# Patient Record
Sex: MALE | Race: WHITE | Employment: FULL TIME | URBAN - METROPOLITAN AREA
[De-identification: names, ages, dates, MRNs, and addresses within clinical notes are randomized per-mention and may not be internally consistent; named-entity substitution may affect disease eponyms.]

---

## 2019-02-01 ENCOUNTER — HOSPITAL ENCOUNTER (OUTPATIENT)
Facility: CLINIC | Age: 29
Setting detail: OBSERVATION
Discharge: HOME | End: 2019-02-02
Attending: EMERGENCY MEDICINE | Admitting: INTERNAL MEDICINE
Payer: COMMERCIAL

## 2019-02-01 ENCOUNTER — APPOINTMENT (OUTPATIENT)
Dept: CT IMAGING | Facility: CLINIC | Age: 29
End: 2019-02-01
Payer: COMMERCIAL

## 2019-02-01 DIAGNOSIS — R41.0 DELIRIUM: ICD-10-CM

## 2019-02-01 DIAGNOSIS — R55 RECURRENT SYNCOPE: ICD-10-CM

## 2019-02-01 DIAGNOSIS — G47.00 INSOMNIA, UNSPECIFIED TYPE: Primary | ICD-10-CM

## 2019-02-01 LAB
ALBUMIN SERPL-MCNC: 4.5 G/DL (ref 3.4–5)
ALBUMIN UR-MCNC: 30 MG/DL
ALP SERPL-CCNC: 134 U/L (ref 40–150)
ALT SERPL W P-5'-P-CCNC: 33 U/L (ref 0–70)
AMPHETAMINES UR QL SCN: NEGATIVE
ANION GAP SERPL CALCULATED.3IONS-SCNC: 16 MMOL/L (ref 6–17)
ANION GAP SERPL CALCULATED.3IONS-SCNC: 8 MMOL/L (ref 3–14)
APPEARANCE UR: CLEAR
AST SERPL W P-5'-P-CCNC: 34 U/L (ref 0–45)
BARBITURATES UR QL: NEGATIVE
BASOPHILS # BLD AUTO: 0.1 10E9/L (ref 0–0.2)
BASOPHILS NFR BLD AUTO: 0.8 %
BENZODIAZ UR QL: NEGATIVE
BILIRUB SERPL-MCNC: 0.3 MG/DL (ref 0.2–1.3)
BILIRUB UR QL STRIP: NEGATIVE
BUN SERPL-MCNC: 8 MG/DL (ref 5–24)
BUN SERPL-MCNC: 9 MG/DL (ref 7–30)
CA-I BLD-SCNC: 4.4 MG/DL (ref 4.4–5.2)
CALCIUM SERPL-MCNC: 8.6 MG/DL (ref 8.5–10.1)
CANNABINOIDS UR QL SCN: POSITIVE
CHLORIDE BLD-SCNC: 104 MMOL/L (ref 94–109)
CHLORIDE SERPL-SCNC: 108 MMOL/L (ref 94–109)
CO2 BLD-SCNC: 25 MMOL/L (ref 20–32)
CO2 BLDCOV-SCNC: 25 MMOL/L (ref 21–28)
CO2 SERPL-SCNC: 26 MMOL/L (ref 20–32)
COCAINE UR QL: NEGATIVE
COLOR UR AUTO: YELLOW
CREAT BLD-MCNC: 1.2 MG/DL (ref 0.66–1.25)
CREAT SERPL-MCNC: 0.89 MG/DL (ref 0.66–1.25)
D DIMER PPP FEU-MCNC: 0.3 UG/ML FEU (ref 0–0.5)
D DIMER PPP FEU-MCNC: NORMAL UG/ML FEU (ref 0–0.5)
DIFFERENTIAL METHOD BLD: ABNORMAL
EOSINOPHIL # BLD AUTO: 0.1 10E9/L (ref 0–0.7)
EOSINOPHIL NFR BLD AUTO: 1.7 %
ERYTHROCYTE [DISTWIDTH] IN BLOOD BY AUTOMATED COUNT: 13.7 % (ref 10–15)
ETHANOL SERPL-MCNC: 0.29 G/DL
GFR SERPL CREATININE-BSD FRML MDRD: 73 ML/MIN/{1.73_M2}
GFR SERPL CREATININE-BSD FRML MDRD: >90 ML/MIN/{1.73_M2}
GLUCOSE BLD-MCNC: 110 MG/DL (ref 70–99)
GLUCOSE BLDC GLUCOMTR-MCNC: 104 MG/DL (ref 70–99)
GLUCOSE SERPL-MCNC: 111 MG/DL (ref 70–99)
GLUCOSE UR STRIP-MCNC: NEGATIVE MG/DL
HCT VFR BLD AUTO: 56 % (ref 40–53)
HCT VFR BLD CALC: 56 %PCV (ref 40–53)
HGB BLD CALC-MCNC: 19 G/DL (ref 13.3–17.7)
HGB BLD-MCNC: 17.9 G/DL (ref 13.3–17.7)
HGB UR QL STRIP: NEGATIVE
IMM GRANULOCYTES # BLD: 0 10E9/L (ref 0–0.4)
IMM GRANULOCYTES NFR BLD: 0.1 %
KETONES UR STRIP-MCNC: NEGATIVE MG/DL
LACTATE BLD-SCNC: 1.8 MMOL/L (ref 0.7–2.1)
LEUKOCYTE ESTERASE UR QL STRIP: NEGATIVE
LYMPHOCYTES # BLD AUTO: 3.1 10E9/L (ref 0.8–5.3)
LYMPHOCYTES NFR BLD AUTO: 42 %
MCH RBC QN AUTO: 28.6 PG (ref 26.5–33)
MCHC RBC AUTO-ENTMCNC: 32 G/DL (ref 31.5–36.5)
MCV RBC AUTO: 90 FL (ref 78–100)
MONOCYTES # BLD AUTO: 0.6 10E9/L (ref 0–1.3)
MONOCYTES NFR BLD AUTO: 7.7 %
MUCOUS THREADS #/AREA URNS LPF: PRESENT /LPF
NEUTROPHILS # BLD AUTO: 3.6 10E9/L (ref 1.6–8.3)
NEUTROPHILS NFR BLD AUTO: 47.7 %
NITRATE UR QL: NEGATIVE
NRBC # BLD AUTO: 0 10*3/UL
NRBC BLD AUTO-RTO: 0 /100
OPIATES UR QL SCN: NEGATIVE
PCO2 BLDV: 46 MM HG (ref 40–50)
PCP UR QL SCN: NEGATIVE
PH BLDV: 7.34 PH (ref 7.32–7.43)
PH UR STRIP: 6 PH (ref 5–7)
PLATELET # BLD AUTO: 243 10E9/L (ref 150–450)
PO2 BLDV: 62 MM HG (ref 25–47)
POTASSIUM BLD-SCNC: 4 MMOL/L (ref 3.4–5.3)
POTASSIUM SERPL-SCNC: 4 MMOL/L (ref 3.4–5.3)
PROT SERPL-MCNC: 8.4 G/DL (ref 6.8–8.8)
RBC # BLD AUTO: 6.26 10E12/L (ref 4.4–5.9)
RBC #/AREA URNS AUTO: 1 /HPF (ref 0–2)
SAO2 % BLDV FROM PO2: 90 %
SODIUM BLD-SCNC: 145 MMOL/L (ref 133–144)
SODIUM SERPL-SCNC: 142 MMOL/L (ref 133–144)
SOURCE: ABNORMAL
SP GR UR STRIP: 1.02 (ref 1–1.03)
UROBILINOGEN UR STRIP-MCNC: 0 MG/DL (ref 0–2)
WBC # BLD AUTO: 7.5 10E9/L (ref 4–11)
WBC #/AREA URNS AUTO: 1 /HPF (ref 0–5)

## 2019-02-01 PROCEDURE — 80320 DRUG SCREEN QUANTALCOHOLS: CPT | Performed by: EMERGENCY MEDICINE

## 2019-02-01 PROCEDURE — 74177 CT ABD & PELVIS W/CONTRAST: CPT

## 2019-02-01 PROCEDURE — 82803 BLOOD GASES ANY COMBINATION: CPT

## 2019-02-01 PROCEDURE — 25000128 H RX IP 250 OP 636: Performed by: EMERGENCY MEDICINE

## 2019-02-01 PROCEDURE — 96374 THER/PROPH/DIAG INJ IV PUSH: CPT | Mod: 59

## 2019-02-01 PROCEDURE — 81001 URINALYSIS AUTO W/SCOPE: CPT | Performed by: EMERGENCY MEDICINE

## 2019-02-01 PROCEDURE — 83605 ASSAY OF LACTIC ACID: CPT

## 2019-02-01 PROCEDURE — 85014 HEMATOCRIT: CPT

## 2019-02-01 PROCEDURE — 80047 BASIC METABLC PNL IONIZED CA: CPT

## 2019-02-01 PROCEDURE — 99220 ZZC INITIAL OBSERVATION CARE,LEVL III: CPT | Performed by: INTERNAL MEDICINE

## 2019-02-01 PROCEDURE — 36415 COLL VENOUS BLD VENIPUNCTURE: CPT | Performed by: EMERGENCY MEDICINE

## 2019-02-01 PROCEDURE — 70498 CT ANGIOGRAPHY NECK: CPT

## 2019-02-01 PROCEDURE — 99291 CRITICAL CARE FIRST HOUR: CPT | Mod: 25

## 2019-02-01 PROCEDURE — 85025 COMPLETE CBC W/AUTO DIFF WBC: CPT | Performed by: EMERGENCY MEDICINE

## 2019-02-01 PROCEDURE — 96375 TX/PRO/DX INJ NEW DRUG ADDON: CPT

## 2019-02-01 PROCEDURE — 80053 COMPREHEN METABOLIC PANEL: CPT | Performed by: EMERGENCY MEDICINE

## 2019-02-01 PROCEDURE — 93005 ELECTROCARDIOGRAM TRACING: CPT

## 2019-02-01 PROCEDURE — 00000146 ZZHCL STATISTIC GLUCOSE BY METER IP

## 2019-02-01 PROCEDURE — 40000275 ZZH STATISTIC RCP TIME EA 10 MIN

## 2019-02-01 PROCEDURE — 96361 HYDRATE IV INFUSION ADD-ON: CPT

## 2019-02-01 PROCEDURE — 71260 CT THORAX DX C+: CPT

## 2019-02-01 PROCEDURE — 70450 CT HEAD/BRAIN W/O DYE: CPT

## 2019-02-01 PROCEDURE — 80307 DRUG TEST PRSMV CHEM ANLYZR: CPT | Performed by: EMERGENCY MEDICINE

## 2019-02-01 PROCEDURE — 85379 FIBRIN DEGRADATION QUANT: CPT | Performed by: EMERGENCY MEDICINE

## 2019-02-01 RX ORDER — IOPAMIDOL 755 MG/ML
500 INJECTION, SOLUTION INTRAVASCULAR ONCE
Status: COMPLETED | OUTPATIENT
Start: 2019-02-01 | End: 2019-02-01

## 2019-02-01 RX ORDER — LIDOCAINE 40 MG/G
CREAM TOPICAL
Status: DISCONTINUED | OUTPATIENT
Start: 2019-02-01 | End: 2019-02-02

## 2019-02-01 RX ORDER — KETOROLAC TROMETHAMINE 15 MG/ML
15 INJECTION, SOLUTION INTRAMUSCULAR; INTRAVENOUS ONCE
Status: COMPLETED | OUTPATIENT
Start: 2019-02-01 | End: 2019-02-01

## 2019-02-01 RX ORDER — SODIUM CHLORIDE 9 MG/ML
1000 INJECTION, SOLUTION INTRAVENOUS CONTINUOUS
Status: DISCONTINUED | OUTPATIENT
Start: 2019-02-01 | End: 2019-02-02

## 2019-02-01 RX ORDER — ACETAMINOPHEN 500 MG
1000 TABLET ORAL ONCE
Status: DISCONTINUED | OUTPATIENT
Start: 2019-02-01 | End: 2019-02-02

## 2019-02-01 RX ORDER — LORAZEPAM 2 MG/ML
1 INJECTION INTRAMUSCULAR ONCE
Status: COMPLETED | OUTPATIENT
Start: 2019-02-01 | End: 2019-02-01

## 2019-02-01 RX ADMIN — LORAZEPAM 1 MG: 2 INJECTION INTRAMUSCULAR; INTRAVENOUS at 21:00

## 2019-02-01 RX ADMIN — IOPAMIDOL 100 ML: 755 INJECTION, SOLUTION INTRAVENOUS at 21:38

## 2019-02-01 RX ADMIN — KETOROLAC TROMETHAMINE 15 MG: 15 INJECTION, SOLUTION INTRAMUSCULAR; INTRAVENOUS at 23:39

## 2019-02-01 RX ADMIN — SODIUM CHLORIDE 1000 ML: 9 INJECTION, SOLUTION INTRAVENOUS at 21:27

## 2019-02-01 RX ADMIN — SODIUM CHLORIDE 80 ML: 9 INJECTION, SOLUTION INTRAVENOUS at 21:39

## 2019-02-01 ASSESSMENT — ENCOUNTER SYMPTOMS
HEADACHES: 1
ABDOMINAL PAIN: 1
BACK PAIN: 1
COUGH: 1
SEIZURES: 1
FLANK PAIN: 1

## 2019-02-02 VITALS
BODY MASS INDEX: 26.51 KG/M2 | RESPIRATION RATE: 16 BRPM | TEMPERATURE: 98.4 F | HEART RATE: 94 BPM | SYSTOLIC BLOOD PRESSURE: 128 MMHG | DIASTOLIC BLOOD PRESSURE: 62 MMHG | OXYGEN SATURATION: 99 % | WEIGHT: 206.6 LBS

## 2019-02-02 PROBLEM — R55 SYNCOPE: Status: ACTIVE | Noted: 2019-02-02

## 2019-02-02 LAB
ALBUMIN SERPL-MCNC: 3.6 G/DL (ref 3.4–5)
ALP SERPL-CCNC: 106 U/L (ref 40–150)
ALT SERPL W P-5'-P-CCNC: 28 U/L (ref 0–70)
ANION GAP SERPL CALCULATED.3IONS-SCNC: 8 MMOL/L (ref 3–14)
AST SERPL W P-5'-P-CCNC: 27 U/L (ref 0–45)
BILIRUB SERPL-MCNC: 0.4 MG/DL (ref 0.2–1.3)
BUN SERPL-MCNC: 9 MG/DL (ref 7–30)
CALCIUM SERPL-MCNC: 8 MG/DL (ref 8.5–10.1)
CHLORIDE SERPL-SCNC: 110 MMOL/L (ref 94–109)
CO2 SERPL-SCNC: 24 MMOL/L (ref 20–32)
CREAT SERPL-MCNC: 0.81 MG/DL (ref 0.66–1.25)
ERYTHROCYTE [DISTWIDTH] IN BLOOD BY AUTOMATED COUNT: 13.8 % (ref 10–15)
GFR SERPL CREATININE-BSD FRML MDRD: >90 ML/MIN/{1.73_M2}
GLUCOSE SERPL-MCNC: 71 MG/DL (ref 70–99)
HCT VFR BLD AUTO: 50.9 % (ref 40–53)
HGB BLD-MCNC: 16.3 G/DL (ref 13.3–17.7)
MCH RBC QN AUTO: 28.6 PG (ref 26.5–33)
MCHC RBC AUTO-ENTMCNC: 32 G/DL (ref 31.5–36.5)
MCV RBC AUTO: 90 FL (ref 78–100)
PLATELET # BLD AUTO: 199 10E9/L (ref 150–450)
POTASSIUM SERPL-SCNC: 3.7 MMOL/L (ref 3.4–5.3)
PROT SERPL-MCNC: 6.7 G/DL (ref 6.8–8.8)
RBC # BLD AUTO: 5.69 10E12/L (ref 4.4–5.9)
SODIUM SERPL-SCNC: 142 MMOL/L (ref 133–144)
WBC # BLD AUTO: 5.8 10E9/L (ref 4–11)

## 2019-02-02 PROCEDURE — 80053 COMPREHEN METABOLIC PANEL: CPT | Performed by: INTERNAL MEDICINE

## 2019-02-02 PROCEDURE — 36415 COLL VENOUS BLD VENIPUNCTURE: CPT | Performed by: INTERNAL MEDICINE

## 2019-02-02 PROCEDURE — 96376 TX/PRO/DX INJ SAME DRUG ADON: CPT

## 2019-02-02 PROCEDURE — 25000132 ZZH RX MED GY IP 250 OP 250 PS 637: Performed by: INTERNAL MEDICINE

## 2019-02-02 PROCEDURE — G0378 HOSPITAL OBSERVATION PER HR: HCPCS

## 2019-02-02 PROCEDURE — 96375 TX/PRO/DX INJ NEW DRUG ADDON: CPT

## 2019-02-02 PROCEDURE — 85027 COMPLETE CBC AUTOMATED: CPT | Performed by: INTERNAL MEDICINE

## 2019-02-02 PROCEDURE — 25000125 ZZHC RX 250: Performed by: INTERNAL MEDICINE

## 2019-02-02 PROCEDURE — 25000128 H RX IP 250 OP 636: Performed by: INTERNAL MEDICINE

## 2019-02-02 PROCEDURE — 99217 ZZC OBSERVATION CARE DISCHARGE: CPT | Performed by: INTERNAL MEDICINE

## 2019-02-02 RX ORDER — METOCLOPRAMIDE HYDROCHLORIDE 5 MG/ML
10 INJECTION INTRAMUSCULAR; INTRAVENOUS EVERY 6 HOURS PRN
Status: DISCONTINUED | OUTPATIENT
Start: 2019-02-02 | End: 2019-02-02 | Stop reason: HOSPADM

## 2019-02-02 RX ORDER — ONDANSETRON 4 MG/1
4 TABLET, ORALLY DISINTEGRATING ORAL EVERY 6 HOURS PRN
Status: DISCONTINUED | OUTPATIENT
Start: 2019-02-02 | End: 2019-02-02 | Stop reason: HOSPADM

## 2019-02-02 RX ORDER — CYCLOBENZAPRINE HCL 5 MG
5 TABLET ORAL 2 TIMES DAILY PRN
Qty: 20 TABLET | Refills: 0 | Status: SHIPPED | OUTPATIENT
Start: 2019-02-02 | End: 2019-02-12

## 2019-02-02 RX ORDER — ONDANSETRON 2 MG/ML
4 INJECTION INTRAMUSCULAR; INTRAVENOUS EVERY 6 HOURS PRN
Status: DISCONTINUED | OUTPATIENT
Start: 2019-02-02 | End: 2019-02-02 | Stop reason: HOSPADM

## 2019-02-02 RX ORDER — LORAZEPAM 1 MG/1
1-2 TABLET ORAL EVERY 30 MIN PRN
Status: DISCONTINUED | OUTPATIENT
Start: 2019-02-02 | End: 2019-02-02 | Stop reason: HOSPADM

## 2019-02-02 RX ORDER — ACETAMINOPHEN 325 MG/1
650 TABLET ORAL EVERY 4 HOURS PRN
Status: DISCONTINUED | OUTPATIENT
Start: 2019-02-02 | End: 2019-02-02 | Stop reason: HOSPADM

## 2019-02-02 RX ORDER — MAGNESIUM SULFATE HEPTAHYDRATE 40 MG/ML
4 INJECTION, SOLUTION INTRAVENOUS EVERY 4 HOURS PRN
Status: DISCONTINUED | OUTPATIENT
Start: 2019-02-02 | End: 2019-02-02 | Stop reason: HOSPADM

## 2019-02-02 RX ORDER — ZOLPIDEM TARTRATE 5 MG/1
5 TABLET ORAL
Qty: 10 TABLET | Refills: 0 | Status: SHIPPED | OUTPATIENT
Start: 2019-02-02 | End: 2019-02-12

## 2019-02-02 RX ORDER — POTASSIUM CHLORIDE 29.8 MG/ML
20 INJECTION INTRAVENOUS
Status: DISCONTINUED | OUTPATIENT
Start: 2019-02-02 | End: 2019-02-02 | Stop reason: HOSPADM

## 2019-02-02 RX ORDER — MULTIPLE VITAMINS W/ MINERALS TAB 9MG-400MCG
1 TAB ORAL DAILY
Status: DISCONTINUED | OUTPATIENT
Start: 2019-02-03 | End: 2019-02-02 | Stop reason: HOSPADM

## 2019-02-02 RX ORDER — SODIUM CHLORIDE, SODIUM LACTATE, POTASSIUM CHLORIDE, CALCIUM CHLORIDE 600; 310; 30; 20 MG/100ML; MG/100ML; MG/100ML; MG/100ML
INJECTION, SOLUTION INTRAVENOUS CONTINUOUS
Status: DISCONTINUED | OUTPATIENT
Start: 2019-02-02 | End: 2019-02-02 | Stop reason: HOSPADM

## 2019-02-02 RX ORDER — PROCHLORPERAZINE 25 MG
25 SUPPOSITORY, RECTAL RECTAL EVERY 12 HOURS PRN
Status: DISCONTINUED | OUTPATIENT
Start: 2019-02-02 | End: 2019-02-02 | Stop reason: HOSPADM

## 2019-02-02 RX ORDER — ONDANSETRON 4 MG/1
4 TABLET, ORALLY DISINTEGRATING ORAL EVERY 6 HOURS PRN
Status: DISCONTINUED | OUTPATIENT
Start: 2019-02-02 | End: 2019-02-02

## 2019-02-02 RX ORDER — KETOROLAC TROMETHAMINE 30 MG/ML
30 INJECTION, SOLUTION INTRAMUSCULAR; INTRAVENOUS EVERY 6 HOURS PRN
Status: DISCONTINUED | OUTPATIENT
Start: 2019-02-02 | End: 2019-02-02 | Stop reason: HOSPADM

## 2019-02-02 RX ORDER — FOLIC ACID 1 MG/1
1 TABLET ORAL DAILY
Status: DISCONTINUED | OUTPATIENT
Start: 2019-02-03 | End: 2019-02-02 | Stop reason: HOSPADM

## 2019-02-02 RX ORDER — ONDANSETRON 2 MG/ML
4 INJECTION INTRAMUSCULAR; INTRAVENOUS EVERY 6 HOURS PRN
Status: DISCONTINUED | OUTPATIENT
Start: 2019-02-02 | End: 2019-02-02

## 2019-02-02 RX ORDER — LANOLIN ALCOHOL/MO/W.PET/CERES
100 CREAM (GRAM) TOPICAL DAILY
Status: DISCONTINUED | OUTPATIENT
Start: 2019-02-03 | End: 2019-02-02 | Stop reason: HOSPADM

## 2019-02-02 RX ORDER — POTASSIUM CL/LIDO/0.9 % NACL 10MEQ/0.1L
10 INTRAVENOUS SOLUTION, PIGGYBACK (ML) INTRAVENOUS
Status: DISCONTINUED | OUTPATIENT
Start: 2019-02-02 | End: 2019-02-02 | Stop reason: HOSPADM

## 2019-02-02 RX ORDER — CLONIDINE HYDROCHLORIDE 0.1 MG/1
0.1 TABLET ORAL 2 TIMES DAILY
Status: DISCONTINUED | OUTPATIENT
Start: 2019-02-02 | End: 2019-02-02 | Stop reason: HOSPADM

## 2019-02-02 RX ORDER — POTASSIUM CHLORIDE 1500 MG/1
20-40 TABLET, EXTENDED RELEASE ORAL
Status: DISCONTINUED | OUTPATIENT
Start: 2019-02-02 | End: 2019-02-02 | Stop reason: HOSPADM

## 2019-02-02 RX ORDER — POTASSIUM CHLORIDE 1.5 G/1.58G
20-40 POWDER, FOR SOLUTION ORAL
Status: DISCONTINUED | OUTPATIENT
Start: 2019-02-02 | End: 2019-02-02 | Stop reason: HOSPADM

## 2019-02-02 RX ORDER — LIDOCAINE 40 MG/G
CREAM TOPICAL
Status: DISCONTINUED | OUTPATIENT
Start: 2019-02-02 | End: 2019-02-02 | Stop reason: HOSPADM

## 2019-02-02 RX ORDER — PROCHLORPERAZINE MALEATE 5 MG
10 TABLET ORAL EVERY 6 HOURS PRN
Status: DISCONTINUED | OUTPATIENT
Start: 2019-02-02 | End: 2019-02-02 | Stop reason: HOSPADM

## 2019-02-02 RX ORDER — POTASSIUM CHLORIDE 7.45 MG/ML
10 INJECTION INTRAVENOUS
Status: DISCONTINUED | OUTPATIENT
Start: 2019-02-02 | End: 2019-02-02 | Stop reason: HOSPADM

## 2019-02-02 RX ORDER — NITROGLYCERIN 0.4 MG/1
0.4 TABLET SUBLINGUAL EVERY 5 MIN PRN
Status: DISCONTINUED | OUTPATIENT
Start: 2019-02-02 | End: 2019-02-02 | Stop reason: HOSPADM

## 2019-02-02 RX ORDER — LORAZEPAM 2 MG/ML
1-2 INJECTION INTRAMUSCULAR EVERY 30 MIN PRN
Status: DISCONTINUED | OUTPATIENT
Start: 2019-02-02 | End: 2019-02-02 | Stop reason: HOSPADM

## 2019-02-02 RX ORDER — ACETAMINOPHEN 650 MG/1
650 SUPPOSITORY RECTAL EVERY 4 HOURS PRN
Status: DISCONTINUED | OUTPATIENT
Start: 2019-02-02 | End: 2019-02-02 | Stop reason: HOSPADM

## 2019-02-02 RX ORDER — METOCLOPRAMIDE 5 MG/1
10 TABLET ORAL EVERY 6 HOURS PRN
Status: DISCONTINUED | OUTPATIENT
Start: 2019-02-02 | End: 2019-02-02 | Stop reason: HOSPADM

## 2019-02-02 RX ADMIN — KETOROLAC TROMETHAMINE 30 MG: 30 INJECTION, SOLUTION INTRAMUSCULAR at 05:58

## 2019-02-02 RX ADMIN — FOLIC ACID: 5 INJECTION, SOLUTION INTRAMUSCULAR; INTRAVENOUS; SUBCUTANEOUS at 01:28

## 2019-02-02 RX ADMIN — ACETAMINOPHEN 650 MG: 325 TABLET, FILM COATED ORAL at 08:09

## 2019-02-02 RX ADMIN — CLONIDINE HYDROCHLORIDE 0.1 MG: 0.1 TABLET ORAL at 08:09

## 2019-02-02 RX ADMIN — SODIUM CHLORIDE, POTASSIUM CHLORIDE, SODIUM LACTATE AND CALCIUM CHLORIDE: 600; 310; 30; 20 INJECTION, SOLUTION INTRAVENOUS at 12:42

## 2019-02-02 RX ADMIN — ONDANSETRON 4 MG: 2 INJECTION INTRAMUSCULAR; INTRAVENOUS at 05:59

## 2019-02-02 RX ADMIN — CLONIDINE HYDROCHLORIDE 0.1 MG: 0.1 TABLET ORAL at 01:51

## 2019-02-02 RX ADMIN — KETOROLAC TROMETHAMINE 30 MG: 30 INJECTION, SOLUTION INTRAMUSCULAR at 10:58

## 2019-02-02 NOTE — PLAN OF CARE
"PRIMARY DIAGNOSIS: \"GENERIC\" NURSING  OUTPATIENT/OBSERVATION GOALS TO BE MET BEFORE DISCHARGE:  ADLs back to baseline: No    Activity and level of assistance: Up with standby assistance.    Pain status: IV toradol     Return to near baseline physical activity: No     Discharge Planner Nurse   Safe discharge environment identified: Yes  Barriers to discharge: Yes       Entered by: ELIZABETH GRANADOS 02/02/2019 4:57 AM     Please review provider order for any additional goals.   Nurse to notify provider when observation goals have been met and patient is ready for discharge.  "

## 2019-02-02 NOTE — ED TRIAGE NOTES
A&O x4, ABCDs intact. Pt arrives with red team activation for syncopal events. Pt states his neck, back and pain hurt. Pt states he has been coughing up blood. Pt in room and appears to have a seizure that lasted for approx. 30 seconds.

## 2019-02-02 NOTE — PROVIDER NOTIFICATION
pt has no diet order and is complaining of pain at a 9 that is located in the neck and radiates to right arm as well as right sided abdominal pain. can pt have something for pain?

## 2019-02-02 NOTE — CONSULTS
"[facility]  Neurology Consultation         Charu Grace MD    Shaw Valencia MRN# [id number]   YOB: 1990 Age: 28 year old      Date of Admission:  2/1/2019  Date of Consult: 2/2/2019                    Primary Care Physician:   Wendy Fields [phone]         Requesting Physician:      Dr. Haile         Chief Complaint:   Possible seizure         History of Present Illness:   Shaw Valencia is a 28 year old male with past medical history significant for hx of depression with prior SI, polysubstance abuse who presented to ER on 2/1/2019 for events concerning for LOC.   He describes for the past 3 months pain in neck into right shoulder, arm, back, abdominal and chest pain.  He was seen in [facility] ER on 1/13/19 with chest pain and right arm numbness work up with MRI Brain and MRI C spine with no significant findings other than on area of T2 signal change in the brain.  Also had neg PE CT chest/CT A/P and discharged with plans to follow up with neurology.  He reports then yesterday had up to 6 events of \"passing out\" due to pain.  He reports over the last 3 months on day prior with 3 events of \"passing out\" due to pain but poor historian.  He reports yesterday that the pain would become intense and the he would just \"pass out and seize\".  He is not able to give a description but states he would be confused and then be told what was happening. Told his eyes would roll back and he would jerk.  He denies tongue biting, loss of bladder or confusion lasting more than seconds after events.  I was not able to speak with a witness. He describes feeling very anxious/panic like with events.   In ER triage reportedly had event with code red called. Given 1mg ativan. In ER, underwent extensive eval with CT head, CTA neck, CT aortic all wnl.  Labs reassuring.  ETOH level 0.29, Utox positive for cannabis.     Today, he reports no change in thinking, rates pain in head, " neck, shoulder, abdomen as 9/10 with Toradol.  He reports no prior seizure hx but with brother with seizures.  He reports one event of possible concussion but no specific prior diagnosis or other head trauma.  No prior LOC events than described above.  He does not drive.  He reports drinks several times a week, uses marijuana several times a day.  Some reports from other he may drink more.  Denies prior hx of withdraw and currently denies withdraw symptoms.  He reports one event of right arm numbness lasting seconds, no numbness in legs or left arm and no changes in bowel or bladder.  Does report coughing blood and urinating blood several days ago.  Denies other acute symptoms.           Past Medical History:     Patient Active Problem List   Diagnosis     Suicide ideation     Unresponsiveness     Alcohol intoxication (H)     Syncope      Past Medical History:   Diagnosis Date     EtOH dependence (H)                Past Surgical History:   No past surgical history on file.           Home Medications:     Prior to Admission medications    Not on File            Current Medications:           cloNIDine  0.1 mg Oral BID     [START ON 2/3/2019] folic acid  1 mg Oral Daily     menthol   Transdermal Q8H     [START ON 2/3/2019] multivitamin w/minerals  1 tablet Oral Daily     sodium chloride (PF)  3 mL Intracatheter Q8H     [START ON 2/3/2019] vitamin B1  100 mg Oral Daily     acetaminophen, acetaminophen, ketorolac, lidocaine 4%, lidocaine (buffered or not buffered), LORazepam **OR** LORazepam, magnesium sulfate, menthol **AND** menthol **AND** menthol, metoclopramide **OR** metoclopramide, nitroGLYcerin, ondansetron **OR** ondansetron, potassium chloride, potassium chloride with lidocaine, potassium chloride, potassium chloride, potassium chloride, prochlorperazine **OR** prochlorperazine **OR** prochlorperazine, sodium chloride (PF)         Allergies:     Allergies   Allergen Reactions     Ibuprofen             Social  History:     ETOH use  Marijuana use  Smoker  Lives with mother   Does not drive  Works for landscape [employer]            Family History:   No family history on file.            Review of Systems:   The 10 point Review of Systems is negative other than noted in the HPI.             Physical Exam:   Heart Rate: 85, Blood pressure 128/62, pulse 94, temperature 98.4  F (36.9  C), temperature source Oral, resp. rate 16, weight 93.7 kg (206 lb 9.6 oz), SpO2 99 %.  206 lbs 9.6 oz       Cardio - Heart Rate Reg, Distal pulses palpable  Resp - Breathing non labored    Neurological Exam:  General: Mental status -Alert and orientated, speech without dysarthria, language fluent with intact naming and repetition  Cranial Nerves- Fundoscopic exam with no disc pallor. Pupils equal round and reactive to light. Extraocular movements intact. No nystagmus.  Face symmetric and intact to light touch, tongue midline, palate activates symmetrically. Shoulder shrug 5/5  Motor: Normal muscle bulk and tone.  Has 5/5 strength in bilateral upper and lower extremities both proximally and distally.   Sensation: intact to light touch and pinprick throughout upper and lower extremities.   Reflexes:  Biceps 2/2, brachioradialis 2/2, patella 2/2, ankles 2/2, toes downgoing  Cerebellar: intact FNF  Gait: patient laying in bed.          Data:   All new lab and imaging data was reviewed.  Recent Labs   Lab 02/02/19  0607 02/01/19  2109 02/01/19  2104   WBC 5.8  --  7.5   HGB 16.3 19.0* 17.9*   MCV 90  --  90     --  243    145* 142   POTASSIUM 3.7 4.0 4.0   CHLORIDE 110* 104 108   CO2 24  --  26   BUN 9 8 9   CR 0.81  --  0.89   ANIONGAP 8 16 8   OPAL 8.0*  --  8.6   GLC 71 110* 111*   ALBUMIN 3.6  --  4.5   PROTTOTAL 6.7*  --  8.4   BILITOTAL 0.4  --  0.3   ALKPHOS 106  --  134   ALT 28  --  33   AST 27  --  34       ..  Recent Results (from the past 24 hour(s))   Head CT w/o contrast    Narrative    CT SCAN OF THE HEAD WITHOUT CONTRAST    2/1/2019 10:02 PM     HISTORY: Altered level of consciousness (LOC), unexplained.    TECHNIQUE: Axial images of the head and coronal reformations without  IV contrast material. Radiation dose for this scan was reduced using  automated exposure control, adjustment of the mA and/or kV according  to patient size, or iterative reconstruction technique.    COMPARISON: Head CT 10/21/2014    FINDINGS: The cerebral hemispheres, brainstem, and cerebellum  demonstrate normal morphology and attenuation. No evidence of acute  ischemia, hemorrhage, mass, mass effect, or hydrocephalus. The  visualized calvarium, skull base, and extracranial soft tissues are  unremarkable. Mild paranasal sinus mucosal thickening is present.      Impression    IMPRESSION: No acute intracranial abnormality.    MELISSA GAN MD   CT Head Neck Angio w/o & w Contrast    Narrative    CT ANGIOGRAM OF THE HEAD AND NECK WITH CONTRAST  2/1/2019 10:03 PM     HISTORY: Headache, sudden, carotid/vertebral dissection suspected.     TECHNIQUE:  CT angiography with an injection of 60mL Isovue-370 IV  with scans through the head and neck. Images were transferred to a  separate 3-D workstation where multiplanar reformations and 3-D images  were created. Estimates of carotid stenoses are made relative to the  distal internal carotid artery diameters except as noted. Radiation  dose for this scan was reduced using automated exposure control,  adjustment of the mA and/or kV according to patient size, or iterative  reconstruction technique.    COMPARISON: None.     CT ANGIOGRAM HEAD FINDINGS:    The vertebral arteries, basilar artery, and posterior cerebral  arteries are patent. The internal carotid arteries, anterior cerebral  arteries, and middle cerebral arteries are patent. Dural venous  sinuses are unremarkable.     CT ANGIOGRAM NECK FINDINGS:   The common carotid, internal carotid, external carotid, and vertebral  arteries are patent. No evidence of dissection  or flow-limiting  stenosis.       Impression    IMPRESSION:   Patent arteries in the head and neck. No evidence of dissection.     MELISSA GAN MD   CT Aortic Survey w Contrast    Narrative    CT AORTIC SURVEY WITH CONTRAST   2/1/2019 10:04 PM     HISTORY: Chest, back and abdominal pain.    TECHNIQUE: 40mL Isovue-370 IV were administered. After contrast  administration, volumetric helical sections were acquired from the  thoracic inlet to the ischial tuberosities. Coronal images were also  reconstructed. Radiation dose for this scan was reduced using  automated exposure control, adjustment of the mA and/or kV according  to patient size, or iterative reconstruction technique.    COMPARISON: None.    FINDINGS:    Chest: The thoracic aorta is of normal caliber, without evidence for  aneurysm or dissection. No pleural or pericardial effusions. No  enlarged lymph nodes are identified in the chest. The lungs are clear.      Abdomen and Pelvis: No evidence for abdominal aortic aneurysm or  dissection. The liver, gallbladder, spleen, adrenal glands, pancreas,  and kidneys are unremarkable. No hydronephrosis. No bowel obstruction.  No free fluid in the pelvis. No evidence for colitis or  diverticulitis. No intra-abdominal fluid collections. Small  fat-containing ventral hernia in the supraumbilical region.       Impression    IMPRESSION: No acute abnormality in the chest, abdomen, or pelvis. No  evidence for aortic aneurysm or dissection.      DIOGO BOYER MD     Other from [facility]     1/13/19 MRI Brain wo  INDICATION:  Right arm numbness.    TECHNIQUE:  Multiplanar multisequence noncontrast MR images were obtained through the brain.    COMPARISON:  CT brain 02/09/2018.    FINDINGS:  The ventricles and sulci are within normal limits for patient age. No mass effect or midline shift. Small 8 mm ovoid focus of T2 FLAIR hyperintensity within the periventricular white matter adjacent to the anterior margin of the left  temporal horn, nonspecific. No other parenchymal signal abnormalities are identified.  No intracranial hemorrhage or pathologic extra-axial fluid collection. No areas of diffusion restriction to suggest acute infarction.  The major arterial flow voids of the skullbase are preserved. The globes are symmetric in size. Mild-to-moderate opacification of the ethmoid air cells. Mild mucosal thickening in the remainder of the paranasal sinuses. Trace fluid in the left mastoid air cells.    IMPRESSION:  1. No acute infarction, mass effect, or intracranial hemorrhage.  2. Small ovoid focus of T2 prolongation within the periventricular white matter adjacent to the left temporal horn. Differential considerations include sequelae of prior inflammation or demyelination.    MRI brain with contrast 1/13/19  INDICATION:  Periventricular signal abnormality adjacent to the left temporal horn.    TECHNIQUE:  Multiplanar T1 postcontrast MR images were obtained through the brain.    COMPARISON:  MRI brain 01/13/2019 earlier the same day.    FINDINGS:  No pathologic intracranial enhancement is identified.     IMPRESSION:  1. No pathologic intracranial enhancement. Specifically, no abnormal enhancement corresponding to the focus of signal abnormality within the left temporal periventricular white matter.      1/13/19 MRI C spine wo  INDICATION:  Right arm numbness.    TECHNIQUE:  Multiplanar multisequence noncontrast MR images were obtained through the cervical spine.    COMPARISON:  None.    FINDINGS:  The patient was rotated during acquisition of the axial T2 sequence.  The cervical lordosis is maintained. No spondylolisthesis. Vertebral heights are preserved. No acute fracture. No concerning bone marrow signal abnormalities. The cervical cord is normal in signal intensity.  C2-3: No significant spinal canal or neural foraminal stenosis.  C3-4: Mild disc degeneration. Shallow annular bulging and marginal spurring. No significant spinal  canal or neural foraminal stenosis.  C4-5: No significant spinal canal or neural foraminal stenosis.  C5-6: Marginal spurring. No significant spinal canal or neural foraminal stenosis.  C6-7: No significant spinal canal or neural foraminal stenosis.  C7-T1: No significant spinal canal or neural foraminal stenosis.  No significant spinal canal or neural foraminal stenosis in the visualized upper thoracic spine.    IMPRESSION:  1. No acute fracture, spondylolisthesis, or cord signal abnormality.  2. Minimal cervical spondylosis without significant spinal canal or neural foraminal stenosis.       Assessment and Plan:     1. Spells of loss of consciousness  Possible seizure but given description/frequency/ and timing of events this sounds unlikely. Given association with polysubstance use could consider ETOH withdraw  Seizure or seizure related to marijuana use but no active signs of withdraw.    Strong clinical suspicion for non-epileptic events but was not able to interview 1st person witness.  Has had Head CT and MRI brain with only one nonspecific T2 change on 1/13/19.   Plan for neurology f/u outpt and outpatient EEG  (neurology was also recommended at discharge on 1/13/19 from [facility] and pt is unclear if f/u is currently pending)  Does not currently drive.   Would not start anti-epileptics currently.       2. Neck pain/headache/right shoulder pain -  Ongoing for 3 months - prior eval with MRI B/C on 1/13/19 with no acute findings  Per primary team requesting narcotics - defer discharge pain med to primary team  Recommend f/u with PCP given neg work up and consider pain clinic referral      3. Abdominal Pain  Reports hx of blood in urine and hemoptysis   Eval neg -  Defer to primary team       4. Polysubstance abuse  Social work eval for chemical dependency    5. Depression history  Concern depression/mood could be contributing to presentation  Recommend psych eval as outpt if discharges

## 2019-02-02 NOTE — ED NOTES
Pt provided urine sample via urinal at bedside. No complications when pt standing.   1 day old ex FT baby boy   doing well, with no major concerns.   Feeding breast milk with good urine output and stool    Physical Examination  Vital signs: T(C): 36.9 (19 @ 20:55), Max: 36.9 (19 @ 20:55)  HR: 123 (19 @ 20:55) (114 - 128)  BP: --  RR: 41 (19 @ 20:55) (39 - 41)  SpO2: --  Wt(kg): 3765g  Weight change =  -2.6 %  General Appearance: comfortable, no distress, no dysmorphic features   Head: normocephalic, anterior fontanelle open and flat  Eyes/ENT: red reflex present b/l, palate intact  Neck/clavicles: no masses, no crepitus  Chest: no grunting, flaring or retractions, clear and equal breath sounds b/l  CV: RRR, nl S1 S2, no murmurs, well perfused  Abdomen: soft, nontender, nondistended, no masses  :  normal male genitalia, testes descended b/l, anus appears to be patent  Back: no defects  Extremities: full range of motion, no hip clicks, normal digits. 2+ Femoral pulses.  Neuro: good tone, moves all extremities, symmetric Dudley, suck, grasp  Skin: no lesions, no jaundice

## 2019-02-02 NOTE — PLAN OF CARE
OBSERVATION patient END time: 1600  Reviewed AVS info with pt/SO.both state an understanding .  RX filled for flexaril & Ambien. Discussed need for ETOH cessation.  Also discussed need to make appts on Monday when offices open for   Neurology , pain clinician , mental health for anxiety & depression concerns &  primary to discuss his ongoing symptoms  Of HA,rt  neck & shoulder pain that have been ongoing for years.  Has had multiple NEG. Imaging scans over last 3-4 yrs . No seizure activity witnessed while on MS3 .

## 2019-02-02 NOTE — ED NOTES
"[facility]  ED Nurse Handoff Report    Shaw Valencia is a 28 year old male   ED Chief complaint: Loss of Consciousness; Headache; and Hematemesis  . ED Diagnosis:   Final diagnoses:   Delirium     Allergies:   Allergies   Allergen Reactions     Ibuprofen        Code Status: Full Code  Activity level - Baseline/Home:  Independent. Activity Level - Current:   Stand with Assist. Lift room needed: No. Bariatric: No   Needed: No   Isolation: No. Infection: Not Applicable.     Vital Signs:   Vitals:    02/01/19 2215 02/01/19 2230 02/01/19 2245 02/01/19 2250   BP: 155/72 (!) 122/98 128/84    Pulse:  94     Resp: 29      Temp:    97.8  F (36.6  C)   TempSrc:    Temporal   SpO2:   98%        Cardiac Rhythm:  ,      Pain level:    Patient confused: No. Patient Falls Risk: Yes.   Elimination Status: Has voided   Patient Report - Initial Complaint: Syncope. Focused Assessment: A&O x4, ABCDs intact. Pt arrives for what his mom states as \"blacking out throughout the day.\" Pt states he has pain in his head, neck, and bilateral flank. Pt is noted to have \"blackouts\" when his mom is in the room, but his behavior changes when RN and MD in room. Pt redirectable. Skin color natural, LS clear bilaterally, peripheral and central pulses strong bilaterally.   Tests Performed: lab, imaging. Abnormal Results:   Labs Ordered and Resulted from Time of ED Arrival Up to the Time of Departure from the ED   GLUCOSE BY METER - Abnormal; Notable for the following components:       Result Value    Glucose 104 (*)     All other components within normal limits   CBC WITH PLATELETS DIFFERENTIAL - Abnormal; Notable for the following components:    RBC Count 6.26 (*)     Hemoglobin 17.9 (*)     Hematocrit 56.0 (*)     All other components within normal limits   DRUG ABUSE SCREEN 77 URINE (FL, RH, SH) - Abnormal; Notable for the following components:    Cannabinoids Qual Urine Positive (*)     All other components within " normal limits   ROUTINE UA WITH MICROSCOPIC - Abnormal; Notable for the following components:    Protein Albumin Urine 30 (*)     Mucous Urine Present (*)     All other components within normal limits   COMPREHENSIVE METABOLIC PANEL - Abnormal; Notable for the following components:    Glucose 111 (*)     All other components within normal limits   ALCOHOL ETHYL - Abnormal; Notable for the following components:    Ethanol g/dL 0.29 (*)     All other components within normal limits   ISTAT BASIC MET ICA HCT POCT - Abnormal; Notable for the following components:    Sodium 145 (*)     Glucose 110 (*)     Hemoglobin 19.0 (*)     Hematocrit - POCT 56 (*)     All other components within normal limits   ISTAT  GASES LACTATE MAGGY POCT - Abnormal; Notable for the following components:    PO2 Venous 62 (*)     All other components within normal limits   D DIMER QUANTITATIVE   D DIMER QUANTITATIVE   PULSE OXIMETRY NURSING   IV ACCESS   PERIPHERAL IV CATHETER     CT Head Neck Angio w/o & w Contrast   Final Result   IMPRESSION:    Patent arteries in the head and neck. No evidence of dissection.       MELISSA GAN MD      CT Aortic Survey w Contrast   Final Result   IMPRESSION: No acute abnormality in the chest, abdomen, or pelvis. No   evidence for aortic aneurysm or dissection.         DIOGO BOYER MD      Head CT w/o contrast   Final Result   IMPRESSION: No acute intracranial abnormality.      MELISSA GAN MD        .   Treatments provided: See MAR  Family Comments: None  OBS brochure/video discussed/provided to patient:  Yes  ED Medications:   Medications   lidocaine 1 % 1 mL (not administered)   lidocaine (LMX4) cream (not administered)   sodium chloride (PF) 0.9% PF flush 3 mL (not administered)   sodium chloride (PF) 0.9% PF flush 3 mL (not administered)   0.9% sodium chloride BOLUS (1,000 mLs Intravenous New Bag 2/1/19 2129)     Followed by   sodium chloride 0.9% infusion (not administered)   LORazepam (ATIVAN)  injection 1 mg (1 mg Intravenous Given 2/1/19 2100)   0.9% sodium chloride BOLUS (0 mLs Intravenous Stopped 2/1/19 2142)   iopamidol (ISOVUE-370) solution 500 mL (100 mLs Intravenous Given 2/1/19 2138)     Drips infusing:  Yes  For the majority of the shift, the patient's behavior Green. Interventions performed were None.     Severe Sepsis OR Septic Shock Diagnosis Present: No      ED Nurse Name/Phone Number: Ko Grewal,   10:56 PM    RECEIVING UNIT ED HANDOFF REVIEW    Above ED Nurse Handoff Report was reviewed: Yes  Reviewed by: ELIZABETH GRANADOS on February 2, 2019 at 12:19 AM

## 2019-02-02 NOTE — PHARMACY-ADMISSION MEDICATION HISTORY
Admission medication history interview status for this patient is complete. See [facility] admission navigator for allergy information, prior to admission medications and immunization status.     Medication history interview source(s):Patient  Medication history resources (including written lists, pill bottles, clinic record):None    Changes made to PTA medication list:  Added: none  Deleted: oxycodone and burow's soln  Changed: none    Actions taken by pharmacist (provider contacted, etc):None     Additional medication history information:None    Medication reconciliation/reorder completed by provider prior to medication history? No    For patients on insulin therapy: no (Yes/No)   Lantus/levemir/NPH/Mix 70/30 dose: ___ in AM/PM or twice daily   Sliding scale Novolog Y/N   If Yes, do you have a baseline novolog pre-meal dose: ______units with meals   Patients eat three meals a day: Y/N ---  How many episodes of hypoglycemia (low blood glucose) do you have weekly: ---   How many missed doses do you have a week: ---  How many times do you check your blood glucose per day: ---  Any Barriers to therapy: cost of medications/comfortable with giving injections (if applicable)/ comfortable and confident with current diabetes regimen ---      Prior to Admission medications    Not on File

## 2019-02-02 NOTE — ED NOTES
Pt's mom in room heard telling pt to sit down and that he is in the hospital. This RN walked into room and firmly told pt that he was just standing for 10 minutes and had no problems and was acting appropriately, and was asked to explain what was happening. This behavior continues when pt's mom is in room with pt, but when mom steps out of room, pt acts appropriately.

## 2019-02-02 NOTE — H&P
Hospitalist Observation Admission Note(Critical access hospital/[facility])    Name: Shaw Valencia    MRN: [id number]          YOB: 1990    Age: 28 year old    Date of admission: 2/1/2019    Primary care provider: Wendy Fields  Admitting physician:Marcos Carballo                 Assessment      Brief summary of admission assessment:Shaw Valencia is a 28 year old  male with a significant past medical history of polysubstance abuse including marijuana and alcohol, depression, previous suicidal ideations, who presents with altered mental status and fall.  Patient is here with his mother with whom he lives.  She stated that he is not doing well the whole day today with occasional sleepiness and body shaking as well as falls.      ED evaluation revealed elevated blood pressure, hemoconcentration, blood alcohol level of 0.29 g/dL. Patient was somnolent but arousable.  He had extensive study of CT scan with aortic survey, CTA of the neck and head without any acute pathology.  Of note patient was seen recently in outside facility and evaluated as well.    He was given IV Lasix, IV fluids as well as Ativan and was admitted to our service.        #1.  Acute encephalopathy: Suspect toxic/metabolic encephalopathy secondary to alcohol intoxication as well as marijuana use  #2.  Alcohol intoxication  #3.  Hemoconcentration  #4.  Frequent falls and concern for seizure: I doubt he has seizure or significant cardiac problem          Reason for admission:    Observation admission for monitoring and management of alcohol intoxication    Observation Goals:  --Improved mental status         Comorbid conditions:      Major depression             Plan     > Admission Status:Admit to observation     >Care plan:  --Admit to observation bed, IV fluid hydration, electrolyte monitoring and replacement, watch for alcohol withdrawal symptoms and treat with Ativan if needed.  Patient is complaining of pain all over  requesting narcotic medications but he needs to be treated with nonnarcotic medications due to alcohol intoxication and substance abuse.  I will make Toradol as needed available for him.    >Supportive care:IV fluids continued  Pain management: acetominophen, anxiolytics and NSAIDs  Nausea and vomiting control measures    >Diet:Diet advanced    >Activity:Advance activity as tolerated    >Education/Counseling :Discussed treatment plan with the patient    >Consults:none     >VTE prophylactic measures:prophylaxis against venous thromboembolism    >Therapies:none       >Additional orders    --Care plan discussed with the patient/family and agreed to care plan   --Patient will be transferred to care of hospitalist attending for further evaluation and management as appropriate   --Old medical orders reviewed   --imaging result independently reviewed by me     (See orders placed for this visit by me )     - Home medication reviewed and will be continued as appropriate once pharmacy reconciliation is completed             Code Status:     Full Code         Disposition:       >anticipate discharge to home and Anticipate discharge tomorrow            Disclaimer: This note consists of symbols derived from keyboarding, dictation and/or voice recognition software. As a result, there may be errors in the script that have gone undetected. Please consider this when interpreting information found in this chart.             Chief Complaint:   Change in mental status     History is obtained from the patient's mother         History of Present Illness:   This patient is a 28 year old  male with a significant past medical history of depression with previous suicidal ideation, recent visit to emergency department elsewhere for similar problem who presents with the following condition requiring a hospital admission:    Acute encephalopathy likely secondary to polysubstance abuse including alcohol and marijuana.    Patient is 28-year-old  male who lives with his mother presents with change in mental status and frequent falls.  Patient is unable to give history due to her change in mental status.  His mother was concerned about him the whole day as he was not awake enough.  He is at times lethargic and she noticed shaking of his body at times.  She is concerned about seizure activity.  Patient had no vomiting but she stated that he has been drinking alcohol and smoking marijuana.  On arrival to the emergency department he was complaining of multiple pains all over including abdominal pain, back pain headache and extensive workup done by emergency department which came back negative.              Past Medical History:     Past Medical History:   Diagnosis Date     EtOH dependence (H)             Past Surgical History:   No past surgical history on file.          Social History:     Social History     Tobacco Use     Smoking status: Current Every Day Smoker     Packs/day: 1.00     Years: 10.00     Pack years: 10.00     Smokeless tobacco: Never Used   Substance Use Topics     Alcohol use: No     Comment: Binge - going into treatment  Sober 8 months             Family History:   Reviewed and non contributory        Allergies:     Allergies   Allergen Reactions     Ibuprofen              Medications:         Prior to Admission medications    Medication Sig Last Dose Taking? Auth Provider   aluminum acetate (BUROW'S) SOLN Apply 5 mLs topically 3 times daily   Tucker Bryan, SAL CNP   oxyCODONE (ROXICODONE) 5 MG immediate release tablet Take 1 tablet (5 mg) by mouth every 6 hours as needed for moderate to severe pain or pain   Geraldo Santiago MD          Review of Systems:     A Comprehensive greater than 10 system review of systems was carried out.  Pertinent positives and negatives are noted above in HPI.  Otherwise negative for contributory information.              Physical Exam:     Vitals were reviewed    Temp:  [97.8  F (36.6  C)-99.5  F  (37.5  C)] 97.8  F (36.6  C)  Pulse:  [] 94  Heart Rate:  [] 93  Resp:  [14-83] 29  BP: (122-155)/() 128/84  SpO2:  [90 %-98 %] 98 %  Patient is somnolent but arousable.  He is a little confused and uncooperative.  He is asking for pain medications.  His mother is at bedside assisting with history.  Neck is supple no JVD no thyromegaly  Atraumatic normocephalic head, pupils are equal bilaterally, no icterus  Regular rate and rhythm, no murmur or gallop  Respiratory exam is within normal limits with normal work of breathing.  No wheezing or crackles  Abdomen is soft mildly tender on palpation but no rebound tenderness or rigidity  Extremities showed no evidence of edema or deformity  Neurologic exam is nonfocal but patient is confused and somnolent.  Psych exam unable to assess completely but patient is uncooperative and asking for narcotic pain medications.         Data:       All laboratory and imaging data in the past 24 hours reviewed     Results for orders placed or performed during the hospital encounter of 02/01/19   CT Aortic Survey w Contrast    Narrative    CT AORTIC SURVEY WITH CONTRAST   2/1/2019 10:04 PM     HISTORY: Chest, back and abdominal pain.    TECHNIQUE: 40mL Isovue-370 IV were administered. After contrast  administration, volumetric helical sections were acquired from the  thoracic inlet to the ischial tuberosities. Coronal images were also  reconstructed. Radiation dose for this scan was reduced using  automated exposure control, adjustment of the mA and/or kV according  to patient size, or iterative reconstruction technique.    COMPARISON: None.    FINDINGS:    Chest: The thoracic aorta is of normal caliber, without evidence for  aneurysm or dissection. No pleural or pericardial effusions. No  enlarged lymph nodes are identified in the chest. The lungs are clear.      Abdomen and Pelvis: No evidence for abdominal aortic aneurysm or  dissection. The liver, gallbladder, spleen,  adrenal glands, pancreas,  and kidneys are unremarkable. No hydronephrosis. No bowel obstruction.  No free fluid in the pelvis. No evidence for colitis or  diverticulitis. No intra-abdominal fluid collections. Small  fat-containing ventral hernia in the supraumbilical region.       Impression    IMPRESSION: No acute abnormality in the chest, abdomen, or pelvis. No  evidence for aortic aneurysm or dissection.      DIOGO BOYER MD   CT Head Neck Angio w/o & w Contrast    Narrative    CT ANGIOGRAM OF THE HEAD AND NECK WITH CONTRAST  2/1/2019 10:03 PM     HISTORY: Headache, sudden, carotid/vertebral dissection suspected.     TECHNIQUE:  CT angiography with an injection of 60mL Isovue-370 IV  with scans through the head and neck. Images were transferred to a  separate 3-D workstation where multiplanar reformations and 3-D images  were created. Estimates of carotid stenoses are made relative to the  distal internal carotid artery diameters except as noted. Radiation  dose for this scan was reduced using automated exposure control,  adjustment of the mA and/or kV according to patient size, or iterative  reconstruction technique.    COMPARISON: None.     CT ANGIOGRAM HEAD FINDINGS:    The vertebral arteries, basilar artery, and posterior cerebral  arteries are patent. The internal carotid arteries, anterior cerebral  arteries, and middle cerebral arteries are patent. Dural venous  sinuses are unremarkable.     CT ANGIOGRAM NECK FINDINGS:   The common carotid, internal carotid, external carotid, and vertebral  arteries are patent. No evidence of dissection or flow-limiting  stenosis.       Impression    IMPRESSION:   Patent arteries in the head and neck. No evidence of dissection.     MELISSA GAN MD   Head CT w/o contrast    Narrative    CT SCAN OF THE HEAD WITHOUT CONTRAST   2/1/2019 10:02 PM     HISTORY: Altered level of consciousness (LOC), unexplained.    TECHNIQUE: Axial images of the head and coronal  reformations without  IV contrast material. Radiation dose for this scan was reduced using  automated exposure control, adjustment of the mA and/or kV according  to patient size, or iterative reconstruction technique.    COMPARISON: Head CT 10/21/2014    FINDINGS: The cerebral hemispheres, brainstem, and cerebellum  demonstrate normal morphology and attenuation. No evidence of acute  ischemia, hemorrhage, mass, mass effect, or hydrocephalus. The  visualized calvarium, skull base, and extracranial soft tissues are  unremarkable. Mild paranasal sinus mucosal thickening is present.      Impression    IMPRESSION: No acute intracranial abnormality.    MELISSA GAN MD   Glucose by meter   Result Value Ref Range    Glucose 104 (H) 70 - 99 mg/dL   CBC + differential   Result Value Ref Range    WBC 7.5 4.0 - 11.0 10e9/L    RBC Count 6.26 (H) 4.4 - 5.9 10e12/L    Hemoglobin 17.9 (H) 13.3 - 17.7 g/dL    Hematocrit 56.0 (H) 40.0 - 53.0 %    MCV 90 78 - 100 fl    MCH 28.6 26.5 - 33.0 pg    MCHC 32.0 31.5 - 36.5 g/dL    RDW 13.7 10.0 - 15.0 %    Platelet Count 243 150 - 450 10e9/L    Diff Method Automated Method     % Neutrophils 47.7 %    % Lymphocytes 42.0 %    % Monocytes 7.7 %    % Eosinophils 1.7 %    % Basophils 0.8 %    % Immature Granulocytes 0.1 %    Nucleated RBCs 0 0 /100    Absolute Neutrophil 3.6 1.6 - 8.3 10e9/L    Absolute Lymphocytes 3.1 0.8 - 5.3 10e9/L    Absolute Monocytes 0.6 0.0 - 1.3 10e9/L    Absolute Eosinophils 0.1 0.0 - 0.7 10e9/L    Absolute Basophils 0.1 0.0 - 0.2 10e9/L    Abs Immature Granulocytes 0.0 0 - 0.4 10e9/L    Absolute Nucleated RBC 0.0    Drug abuse screen 77 urine ([place])   Result Value Ref Range    Amphetamine Qual Urine Negative NEG^Negative    Barbiturates Qual Urine Negative NEG^Negative    Benzodiazepine Qual Urine Negative NEG^Negative    Cannabinoids Qual Urine Positive (A) NEG^Negative    Cocaine Qual Urine Negative NEG^Negative    Opiates Qualitative Urine Negative  NEG^Negative    PCP Qual Urine Negative NEG^Negative   UA with Microscopic   Result Value Ref Range    Color Urine Yellow     Appearance Urine Clear     Glucose Urine Negative NEG^Negative mg/dL    Bilirubin Urine Negative NEG^Negative    Ketones Urine Negative NEG^Negative mg/dL    Specific Gravity Urine 1.020 1.003 - 1.035    Blood Urine Negative NEG^Negative    pH Urine 6.0 5.0 - 7.0 pH    Protein Albumin Urine 30 (A) NEG^Negative mg/dL    Urobilinogen mg/dL 0.0 0.0 - 2.0 mg/dL    Nitrite Urine Negative NEG^Negative    Leukocyte Esterase Urine Negative NEG^Negative    Source Midstream Urine     WBC Urine 1 0 - 5 /HPF    RBC Urine 1 0 - 2 /HPF    Mucous Urine Present (A) NEG^Negative /LPF   Comprehensive metabolic panel   Result Value Ref Range    Sodium 142 133 - 144 mmol/L    Potassium 4.0 3.4 - 5.3 mmol/L    Chloride 108 94 - 109 mmol/L    Carbon Dioxide 26 20 - 32 mmol/L    Anion Gap 8 3 - 14 mmol/L    Glucose 111 (H) 70 - 99 mg/dL    Urea Nitrogen 9 7 - 30 mg/dL    Creatinine 0.89 0.66 - 1.25 mg/dL    GFR Estimate >90 >60 mL/min/[1.73_m2]    GFR Estimate If Black >90 >60 mL/min/[1.73_m2]    Calcium 8.6 8.5 - 10.1 mg/dL    Bilirubin Total 0.3 0.2 - 1.3 mg/dL    Albumin 4.5 3.4 - 5.0 g/dL    Protein Total 8.4 6.8 - 8.8 g/dL    Alkaline Phosphatase 134 40 - 150 U/L    ALT 33 0 - 70 U/L    AST 34 0 - 45 U/L   D dimer quantitative   Result Value Ref Range    D Dimer Canceled, Test credited 0.0 - 0.50 ug/ml FEU   Alcohol ethyl   Result Value Ref Range    Ethanol g/dL 0.29 (H) <0.01 g/dL   D dimer quantitative   Result Value Ref Range    D Dimer 0.3 0.0 - 0.50 ug/ml FEU   ISTAT Basic Met ICa HCT POCT   Result Value Ref Range    Sodium 145 (H) 133 - 144 mmol/L    Potassium 4.0 3.4 - 5.3 mmol/L    Chloride 104 94 - 109 mmol/L    Total CO2 25 20 - 32 mmol/L    Anion Gap 16 6 - 17 mmol/L    Glucose 110 (H) 70 - 99 mg/dL    Urea Nitrogen 8 5 - 24 mg/dL    Creatinine 1.2 0.66 - 1.25 mg/dL    GFR Estimate 73 >60  mL/min/[1.73_m2]    GFR Estimate If Black 89 >60 mL/min/[1.73_m2]    Calcium Ionized 4.4 4.4 - 5.2 mg/dL    Hemoglobin 19.0 (H) 13.3 - 17.7 g/dL    Hematocrit - POCT 56 (H) 40.0 - 53.0 %PCV   ISTAT gases lactate timo POCT   Result Value Ref Range    Ph Venous 7.34 7.32 - 7.43 pH    PCO2 Venous 46 40 - 50 mm Hg    PO2 Venous 62 (H) 25 - 47 mm Hg    Bicarbonate Venous 25 21 - 28 mmol/L    O2 Sat Venous 90 %    Lactic Acid 1.8 0.7 - 2.1 mmol/L            Recent Results (from the past 48 hour(s))   Head CT w/o contrast    Narrative    CT SCAN OF THE HEAD WITHOUT CONTRAST   2/1/2019 10:02 PM     HISTORY: Altered level of consciousness (LOC), unexplained.    TECHNIQUE: Axial images of the head and coronal reformations without  IV contrast material. Radiation dose for this scan was reduced using  automated exposure control, adjustment of the mA and/or kV according  to patient size, or iterative reconstruction technique.    COMPARISON: Head CT 10/21/2014    FINDINGS: The cerebral hemispheres, brainstem, and cerebellum  demonstrate normal morphology and attenuation. No evidence of acute  ischemia, hemorrhage, mass, mass effect, or hydrocephalus. The  visualized calvarium, skull base, and extracranial soft tissues are  unremarkable. Mild paranasal sinus mucosal thickening is present.      Impression    IMPRESSION: No acute intracranial abnormality.    MELISSA GAN MD   CT Head Neck Angio w/o & w Contrast    Narrative    CT ANGIOGRAM OF THE HEAD AND NECK WITH CONTRAST  2/1/2019 10:03 PM     HISTORY: Headache, sudden, carotid/vertebral dissection suspected.     TECHNIQUE:  CT angiography with an injection of 60mL Isovue-370 IV  with scans through the head and neck. Images were transferred to a  separate 3-D workstation where multiplanar reformations and 3-D images  were created. Estimates of carotid stenoses are made relative to the  distal internal carotid artery diameters except as noted. Radiation  dose for this scan was  reduced using automated exposure control,  adjustment of the mA and/or kV according to patient size, or iterative  reconstruction technique.    COMPARISON: None.     CT ANGIOGRAM HEAD FINDINGS:    The vertebral arteries, basilar artery, and posterior cerebral  arteries are patent. The internal carotid arteries, anterior cerebral  arteries, and middle cerebral arteries are patent. Dural venous  sinuses are unremarkable.     CT ANGIOGRAM NECK FINDINGS:   The common carotid, internal carotid, external carotid, and vertebral  arteries are patent. No evidence of dissection or flow-limiting  stenosis.       Impression    IMPRESSION:   Patent arteries in the head and neck. No evidence of dissection.     MELISSA GAN MD   CT Aortic Survey w Contrast    Narrative    CT AORTIC SURVEY WITH CONTRAST   2/1/2019 10:04 PM     HISTORY: Chest, back and abdominal pain.    TECHNIQUE: 40mL Isovue-370 IV were administered. After contrast  administration, volumetric helical sections were acquired from the  thoracic inlet to the ischial tuberosities. Coronal images were also  reconstructed. Radiation dose for this scan was reduced using  automated exposure control, adjustment of the mA and/or kV according  to patient size, or iterative reconstruction technique.    COMPARISON: None.    FINDINGS:    Chest: The thoracic aorta is of normal caliber, without evidence for  aneurysm or dissection. No pleural or pericardial effusions. No  enlarged lymph nodes are identified in the chest. The lungs are clear.      Abdomen and Pelvis: No evidence for abdominal aortic aneurysm or  dissection. The liver, gallbladder, spleen, adrenal glands, pancreas,  and kidneys are unremarkable. No hydronephrosis. No bowel obstruction.  No free fluid in the pelvis. No evidence for colitis or  diverticulitis. No intra-abdominal fluid collections. Small  fat-containing ventral hernia in the supraumbilical region.       Impression    IMPRESSION: No acute abnormality  in the chest, abdomen, or pelvis. No  evidence for aortic aneurysm or dissection.      DIOGO BOYER MD       EKG results: Sinus tachycardia           All imaging studies reviewed by me.       Patient`s old medical records reviewed and case discussed with the ED physician.    ED course-Reviewed

## 2019-02-02 NOTE — ED PROVIDER NOTES
History     Chief Complaint:  Syncope     HPI   Shaw Valencia is a 28 year old male with a history of depression and suicidal ideation who presents to the emergency department with his mother for evaluation of syncope. Of note, the patient was recently seen at the [facility] where he was evaluated for chest pain and arm numbness. MRI of the cervical spine and head and CT PE study were all normal. Patient was discharged home with Norco and Flexeril for pain control. For the past couple hours the patient has been reportedly experiencing syncopal events spontaneously, prompting him to seek further evaluation here in the ED. Here, the patient was immediately brought back from triage after a syncopal event. Patient is complaining of back pain radiating into his abdomen and chest with hemoptysis and a headache which he states has been ongoing for months and has been evaluated for at [facility].      Allergies:  Ibuprofen     Medications:    Aluminum Acetate    Past Medical History:    Alcohol Dependence  Suicide Ideation  Depression  Panic Disorder    Past Surgical History:    The patient does not have any pertinent past surgical history.    Family History:    No past pertinent family history.    Social History:  Marital Status:  Single [1]  Tobacco Use: Yes, 1.00 packs/day  Alcohol Use: No    Review of Systems   Respiratory: Positive for cough (Hemoptysis).    Cardiovascular: Positive for chest pain.   Gastrointestinal: Positive for abdominal pain.   Genitourinary: Positive for flank pain.   Musculoskeletal: Positive for back pain.   Neurological: Positive for seizures (Possible), syncope and headaches.   All other systems reviewed and are negative.        Physical Exam     Patient Vitals for the past 24 hrs:   BP Temp Temp src Pulse Heart Rate Resp SpO2   02/01/19 2250 -- 97.8  F (36.6  C) Temporal -- -- -- --   02/01/19 2245 128/84 -- -- -- 93 -- 98 %   02/01/19 2230 (!) 122/98 -- -- 94 93 -- --   02/01/19  2215 155/72 -- -- -- 103 29 --   02/01/19 2200 144/83 -- -- 101 108 28 94 %   02/01/19 2130 -- -- -- -- -- 21 94 %   02/01/19 2115 (!) 141/97 -- -- -- 110 -- 90 %   02/01/19 2109 (!) 153/93 99.5  F (37.5  C) Oral -- -- -- --   02/01/19 2108 (!) 147/101 -- -- -- -- -- --   02/01/19 2100 (!) 147/101 -- -- 130 129 (!) 83 95 %   02/01/19 2056 -- -- -- 124 124 14 96 %       Physical Exam  General: Patient stiffening up. Head cocked bet. Shaking activity of upper and lower extremities. Short lived less than 30 seconds. No further episodes after Ativan.   HEENT:   The scalp and head appear normal    The pupils are equal, round, and reactive to light    Extraocular muscles are intact.    The nose is normal.    The oropharynx is normal.      Uvula is in the midline.    Neck:  Normal range of motion.    Lungs:  Clear.      No rales, no wheezing.      There is no tachypnea.  Non-labored.  Cardiac: Tachycardic     Normal S1 and S2.      No pathological murmur/rub    Abdomen: Soft. No distension, no localized tenderness or rebound.  MS:  Normal tone. Normal movement of all extremities. Tenderness to the right flank region with muscular spasm. Difficulty transferring form lying to sitting due to pain.  Neurologic:     Normal mentation.  No cranial nerve deficits.  No focal motor or sensory changes.      Speech normal.  Psych:  Mostly awake and alert with the exception of shaking episodes.      Appropriate interactions. Answering all questions appropriately.    Not having hallucinations.   Skin:  No rash.      No lesions.    Emergency Department Course   ECG:  Indication: Syncope  Time: 2102  Vent. Rate 113 bpm. NE interval 164. QRS duration 104. QT/QTc 320/438. P-R-T axis 47 6 40. Sinus tachycardia. Possible left atrial enlargement. Borderline ECG. Read time: 2110    Imaging:  CT Aortic Survey without contrast:   No acute abnormality in the chest, abdomen, or pelvis. No evidence for aortic aneurysm or dissection.   As per  radiology.    CT Head without contrast:   No acute intracranial abnormality.   As per radiology.    CT Head Neck Angio w/o and w/ Contrast:  Patent arteries in the head and neck. No evidence of dissection.   As per radiology.    Laboratory:  CBC: WBC: 7.5, HGB: 17.9 (H), PLT: 243  2059 Glucose: 104 (H)  2104 CMP: Glucose 111 (H), o/w WNL (Creatinine: 0.89)  ISTAT Basic Metabolic: Glucose: 110 (H), NA: 145 (H), HGB: 19.0 (H), Hematocrit: 56 (H)  D Dimer: 0.3  ISTAT VBG with Lactate: PO2: 62 (H), o/w WNL  Alcohol Ethyl: 0.29 (H)  UA with micro: Protein Albumin: 60, Mucous: Present, o/w negative  Drug Abuse Screen Urine: Cannabinoids: Positive, o/w WNL    Interventions:  2100 Ativan 1 mg, IV  2127 NS 1L IV    Emergency Department Course:  2056 Patient wheeled in from triage with red team activation.   2056 I began my examination of the patient.    2057 Patient had an episode of seizure like activity.  Ativan ordered.   2059 Bedside fast ultrasound and aortic ultrasound performed. Blood sugar was reported back to me at 104  2103 EKG obtained and analyzed.     IV inserted. Medicine administered as documented above. Blood drawn. This was sent to the lab for further testing, results above.    The patient provided a urine sample here in the emergency department. This was sent for laboratory testing, findings above.      The patient was sent for a aortic survey CT, head and neck CTA and head CT while in the emergency department, findings above.    EKG obtained in the ED, see results above.      2116 I consulted with radiology concerning the patient's presentation here in the ED.     2153 I rechecked the patient and informed the patient of the workup thus far.     2258 I rechecked the patient and informed him of the plan for discharge.     2311 I consulted with Dr. Carballo of the hospitalist services. They are in agreement to accept the patient for admission.    Findings and plan explained to the patient and mother who  consents to admission. Discussed the patient with Dr. Carballo, who will admit the patient to an obs bed for further monitoring, evaluation, and treatment.      Impression & Plan      Medical Decision Making:  Shaw Valencia is a 28 year old male who presents into the ED with recurrent syncopal episodes. He was rushed back and made a code red patient. Evaluation reveals young 28 year old who is complaining of pain in his head, chest, abdomen and pelvis. He also had what looked to be a seizure like episode or possible a seizure. He has a pain in multiple organ areas. I sent him for a CT aortic survey and everything within the chest, abdomen and pelvis was normal. I also did a bedside ultrasound initially, assisted by Dr. Phelps, and the caliber of the aorta was between 1.8 and 2.0 cm all the way through to the iliac takeoff. There was also no free fluid on fast exam. Because of complaints of a headache and prior workup done at [facility], but no CT was done or MRI of the vessels, thus CTA of the head and neck was done today with complaints of headache. This was negative as well. Labs show hemoconcentration with hemoglobin of 17. He was given 1L of IV fluids here. He was given Tylenol for pain here and showed a positive THC. Urinalysis without blood.     Further examination reveals tenderness in the right flank region and muscular spasm when he tries to move or is palpated deeper. At this time the patient is being admitted to observation telemetry under the care of Dr. Carballo, with whom I spoke. He will be further evaluated for what seems to be symptoms suggestive for recurrent syncope. He could be having atypical seizure like activity causing this. It doesn't appear that he is having any arrhythmias. He is only mildly in sinus tachycardia at this point. Mother is in the room. It was explained to the patient and the mother, who both understand the plan for admission.     Critical Care time:  was 30 minutes for  this patient excluding procedures.    Diagnosis:    ICD-10-CM    1. Delirium R41.0    2. Recurrent syncope R55        Disposition:  Admitted to Dr. Haris Briceno Disclosure:  I, Lucian Malloy, am serving as a scribe on 2/1/2019 at 9:07 PM to personally document services performed by [place] MD based on my observations and the provider's statements to me.     Lucian Malloy  2/1/2019   [facility] EMERGENCY DEPARTMENT       Humble Hill MD  02/02/19 2133

## 2019-02-02 NOTE — PROGRESS NOTES
X-cover    Called for neck pain with radiation and need for diet order.     Neck pain is chronic and has had w/u at other facilities and etiology not identified.  Admitted with AMS in setting of acute intoxication.     Ordered Aqua K and mentholated patch, patient refusing apap and ibuprofen.   Reg diet ordered

## 2019-02-02 NOTE — PLAN OF CARE
"PRIMARY DIAGNOSIS: \"GENERIC\" NURSING  OUTPATIENT/OBSERVATION GOALS TO BE MET BEFORE DISCHARGE:  ADLs back to baseline: No    Activity and level of assistance: Up with standby assistance.    Pain status: IV Toradol      Return to near baseline physical activity: No     Discharge Planner Nurse   Safe discharge environment identified: Yes  Barriers to discharge: Yes       Entered by: ELIZABETH GRANADOS 02/02/2019 4:56 AM   Pt alert and oriented uncertain about the date. Complains of pain to the neck that radiates to the right arm as well as abdominal pain to the right side.Pt refuses ice hot patch and heating pad at this time. CIWA completed no intervention needed. IV infusing. Tele SR with ST elevation. Will continue to monitor.   Please review provider order for any additional goals.   Nurse to notify provider when observation goals have been met and patient is ready for discharge.  "

## 2019-02-02 NOTE — PROGRESS NOTES
PRIMARY DIAGNOSIS: SYNCOPE  OUTPATIENT/OBSERVATION GOALS TO BE MET BEFORE DISCHARGE:    1. Diagnostic testing complete & at baseline neurologic function    2. Cleared by consultants (if involved)    Interpretation of rhythm per telemetry tech:SR-ST     Please review provider orders for any additional goals.     Nurse to notify provider when observation goals have been met and patient is ready for discharge.     Eval done by Neuro. Felt not to be seizure activity. Will f/u  With neuro, pain, mental health & primary as out.pt as his S/S of  HA, rt neck/shoulder pain to abd -despite neg imaging   Have been going on for years

## 2019-02-04 LAB
INTERPRETATION ECG - MUSE: NORMAL
INTERPRETATION ECG - MUSE: NORMAL

## 2020-09-20 ENCOUNTER — APPOINTMENT (OUTPATIENT)
Dept: CT IMAGING | Facility: CLINIC | Age: 30
End: 2020-09-20
Attending: INTERNAL MEDICINE
Payer: COMMERCIAL

## 2020-09-20 ENCOUNTER — HOSPITAL ENCOUNTER (EMERGENCY)
Facility: CLINIC | Age: 30
Discharge: HOME | End: 2020-09-21
Attending: INTERNAL MEDICINE | Admitting: INTERNAL MEDICINE
Payer: COMMERCIAL

## 2020-09-20 DIAGNOSIS — G44.53 PRIMARY THUNDERCLAP HEADACHE: ICD-10-CM

## 2020-09-20 LAB
ALBUMIN SERPL-MCNC: 4.8 G/DL (ref 3.4–5)
ALP SERPL-CCNC: 83 U/L (ref 40–150)
ALT SERPL W P-5'-P-CCNC: 26 U/L (ref 0–70)
ANION GAP SERPL CALCULATED.3IONS-SCNC: 12 MMOL/L (ref 3–14)
AST SERPL W P-5'-P-CCNC: 20 U/L (ref 0–45)
BASOPHILS # BLD AUTO: 0.1 10E9/L (ref 0–0.2)
BASOPHILS NFR BLD AUTO: 0.5 %
BILIRUB SERPL-MCNC: 0.6 MG/DL (ref 0.2–1.3)
BUN SERPL-MCNC: 17 MG/DL (ref 7–30)
CALCIUM SERPL-MCNC: 9.5 MG/DL (ref 8.5–10.1)
CHLORIDE SERPL-SCNC: 104 MMOL/L (ref 94–109)
CO2 SERPL-SCNC: 22 MMOL/L (ref 20–32)
CREAT SERPL-MCNC: 0.91 MG/DL (ref 0.66–1.25)
DIFFERENTIAL METHOD BLD: ABNORMAL
EOSINOPHIL # BLD AUTO: 0.1 10E9/L (ref 0–0.7)
EOSINOPHIL NFR BLD AUTO: 1.1 %
ERYTHROCYTE [DISTWIDTH] IN BLOOD BY AUTOMATED COUNT: 13.2 % (ref 10–15)
GFR SERPL CREATININE-BSD FRML MDRD: >90 ML/MIN/{1.73_M2}
GLUCOSE SERPL-MCNC: 134 MG/DL (ref 70–99)
HCT VFR BLD AUTO: 48.2 % (ref 40–53)
HGB BLD-MCNC: 15.6 G/DL (ref 13.3–17.7)
IMM GRANULOCYTES # BLD: 0 10E9/L (ref 0–0.4)
IMM GRANULOCYTES NFR BLD: 0.2 %
LYMPHOCYTES # BLD AUTO: 1.5 10E9/L (ref 0.8–5.3)
LYMPHOCYTES NFR BLD AUTO: 14 %
MCH RBC QN AUTO: 29.5 PG (ref 26.5–33)
MCHC RBC AUTO-ENTMCNC: 32.4 G/DL (ref 31.5–36.5)
MCV RBC AUTO: 91 FL (ref 78–100)
MONOCYTES # BLD AUTO: 0.6 10E9/L (ref 0–1.3)
MONOCYTES NFR BLD AUTO: 5.7 %
NEUTROPHILS # BLD AUTO: 8.6 10E9/L (ref 1.6–8.3)
NEUTROPHILS NFR BLD AUTO: 78.5 %
NRBC # BLD AUTO: 0 10*3/UL
NRBC BLD AUTO-RTO: 0 /100
PLATELET # BLD AUTO: 208 10E9/L (ref 150–450)
POTASSIUM SERPL-SCNC: 3.2 MMOL/L (ref 3.4–5.3)
PROT SERPL-MCNC: 8.2 G/DL (ref 6.8–8.8)
RBC # BLD AUTO: 5.29 10E12/L (ref 4.4–5.9)
SODIUM SERPL-SCNC: 138 MMOL/L (ref 133–144)
WBC # BLD AUTO: 10.9 10E9/L (ref 4–11)

## 2020-09-20 PROCEDURE — 70450 CT HEAD/BRAIN W/O DYE: CPT

## 2020-09-20 PROCEDURE — 62270 DX LMBR SPI PNXR: CPT

## 2020-09-20 PROCEDURE — 96374 THER/PROPH/DIAG INJ IV PUSH: CPT | Mod: 59

## 2020-09-20 PROCEDURE — 96376 TX/PRO/DX INJ SAME DRUG ADON: CPT

## 2020-09-20 PROCEDURE — 25000128 H RX IP 250 OP 636: Performed by: INTERNAL MEDICINE

## 2020-09-20 PROCEDURE — 85025 COMPLETE CBC W/AUTO DIFF WBC: CPT | Performed by: INTERNAL MEDICINE

## 2020-09-20 PROCEDURE — 70498 CT ANGIOGRAPHY NECK: CPT

## 2020-09-20 PROCEDURE — 99285 EMERGENCY DEPT VISIT HI MDM: CPT | Mod: 25

## 2020-09-20 PROCEDURE — 80053 COMPREHEN METABOLIC PANEL: CPT | Performed by: INTERNAL MEDICINE

## 2020-09-20 PROCEDURE — 25000125 ZZHC RX 250: Performed by: INTERNAL MEDICINE

## 2020-09-20 PROCEDURE — 96375 TX/PRO/DX INJ NEW DRUG ADDON: CPT

## 2020-09-20 RX ORDER — METOCLOPRAMIDE HYDROCHLORIDE 5 MG/ML
10 INJECTION INTRAMUSCULAR; INTRAVENOUS ONCE
Status: COMPLETED | OUTPATIENT
Start: 2020-09-20 | End: 2020-09-20

## 2020-09-20 RX ORDER — ONDANSETRON 2 MG/ML
4 INJECTION INTRAMUSCULAR; INTRAVENOUS ONCE
Status: COMPLETED | OUTPATIENT
Start: 2020-09-20 | End: 2020-09-20

## 2020-09-20 RX ORDER — DIPHENHYDRAMINE HYDROCHLORIDE 50 MG/ML
25 INJECTION INTRAMUSCULAR; INTRAVENOUS ONCE
Status: COMPLETED | OUTPATIENT
Start: 2020-09-20 | End: 2020-09-20

## 2020-09-20 RX ORDER — HYDROMORPHONE HYDROCHLORIDE 1 MG/ML
0.5 INJECTION, SOLUTION INTRAMUSCULAR; INTRAVENOUS; SUBCUTANEOUS
Status: DISCONTINUED | OUTPATIENT
Start: 2020-09-20 | End: 2020-09-20

## 2020-09-20 RX ORDER — IOPAMIDOL 755 MG/ML
500 INJECTION, SOLUTION INTRAVASCULAR ONCE
Status: COMPLETED | OUTPATIENT
Start: 2020-09-20 | End: 2020-09-20

## 2020-09-20 RX ADMIN — METOCLOPRAMIDE HYDROCHLORIDE 10 MG: 5 INJECTION INTRAMUSCULAR; INTRAVENOUS at 23:39

## 2020-09-20 RX ADMIN — SODIUM CHLORIDE 80 ML: 9 INJECTION, SOLUTION INTRAVENOUS at 22:05

## 2020-09-20 RX ADMIN — DIPHENHYDRAMINE HYDROCHLORIDE 25 MG: 50 INJECTION, SOLUTION INTRAMUSCULAR; INTRAVENOUS at 23:38

## 2020-09-20 RX ADMIN — IOPAMIDOL 70 ML: 755 INJECTION, SOLUTION INTRAVENOUS at 22:05

## 2020-09-20 RX ADMIN — HYDROMORPHONE HYDROCHLORIDE 1 MG: 1 INJECTION, SOLUTION INTRAMUSCULAR; INTRAVENOUS; SUBCUTANEOUS at 22:19

## 2020-09-20 RX ADMIN — ONDANSETRON 4 MG: 2 INJECTION INTRAMUSCULAR; INTRAVENOUS at 22:30

## 2020-09-20 ASSESSMENT — ENCOUNTER SYMPTOMS
HEADACHES: 1
CONFUSION: 0
ABDOMINAL PAIN: 1

## 2020-09-20 NOTE — ED AVS SNAPSHOT
[facility] Emergency Department  [address]  [facility] [address]  Phone:  [phone]  Fax:  [phone]                                    Shaw Valencia   MRN: [id number]    Department:  [facility] Emergency Department   Date of Visit:  9/20/2020           After Visit Summary Signature Page    I have received my discharge instructions, and my questions have been answered. I have discussed any challenges I see with this plan with the nurse or doctor.    ..........................................................................................................................................  Patient/Patient Representative Signature      ..........................................................................................................................................  Patient Representative Print Name and Relationship to Patient    ..................................................               ................................................  Date                                   Time    ..........................................................................................................................................  Reviewed by Signature/Title    ...................................................              ..............................................  Date                                               Time          22EPIC Rev 08/18

## 2020-09-21 VITALS
SYSTOLIC BLOOD PRESSURE: 125 MMHG | TEMPERATURE: 98.3 F | HEART RATE: 75 BPM | RESPIRATION RATE: 18 BRPM | OXYGEN SATURATION: 96 % | DIASTOLIC BLOOD PRESSURE: 56 MMHG

## 2020-09-21 LAB
APPEARANCE CSF: CLEAR
COLOR CSF: COLORLESS
GLUCOSE CSF-MCNC: 66 MG/DL (ref 40–70)
GRAM STN SPEC: NORMAL
PROT CSF-MCNC: 25 MG/DL (ref 15–60)
RBC # CSF MANUAL: 2 /UL (ref 0–2)
SPECIMEN SOURCE: NORMAL
TUBE # CSF: 4 #
WBC # CSF MANUAL: 2 /UL (ref 0–5)

## 2020-09-21 PROCEDURE — 84157 ASSAY OF PROTEIN OTHER: CPT | Performed by: INTERNAL MEDICINE

## 2020-09-21 PROCEDURE — 87070 CULTURE OTHR SPECIMN AEROBIC: CPT | Performed by: INTERNAL MEDICINE

## 2020-09-21 PROCEDURE — 82945 GLUCOSE OTHER FLUID: CPT | Performed by: INTERNAL MEDICINE

## 2020-09-21 PROCEDURE — 87015 SPECIMEN INFECT AGNT CONCNTJ: CPT | Performed by: INTERNAL MEDICINE

## 2020-09-21 PROCEDURE — 25000128 H RX IP 250 OP 636: Performed by: INTERNAL MEDICINE

## 2020-09-21 PROCEDURE — 87205 SMEAR GRAM STAIN: CPT | Performed by: INTERNAL MEDICINE

## 2020-09-21 PROCEDURE — 89050 BODY FLUID CELL COUNT: CPT | Performed by: INTERNAL MEDICINE

## 2020-09-21 RX ORDER — DEXAMETHASONE SODIUM PHOSPHATE 10 MG/ML
10 INJECTION, SOLUTION INTRAMUSCULAR; INTRAVENOUS ONCE
Status: COMPLETED | OUTPATIENT
Start: 2020-09-21 | End: 2020-09-21

## 2020-09-21 RX ORDER — OLANZAPINE 10 MG/2ML
10 INJECTION, POWDER, FOR SOLUTION INTRAMUSCULAR ONCE
Status: COMPLETED | OUTPATIENT
Start: 2020-09-21 | End: 2020-09-21

## 2020-09-21 RX ORDER — METOCLOPRAMIDE 5 MG/1
5 TABLET ORAL 4 TIMES DAILY PRN
Qty: 15 TABLET | Refills: 0 | Status: SHIPPED | OUTPATIENT
Start: 2020-09-21

## 2020-09-21 RX ORDER — HALOPERIDOL 5 MG/ML
5 INJECTION INTRAMUSCULAR ONCE
Status: COMPLETED | OUTPATIENT
Start: 2020-09-21 | End: 2020-09-21

## 2020-09-21 RX ADMIN — HYDROMORPHONE HYDROCHLORIDE 1 MG: 1 INJECTION, SOLUTION INTRAMUSCULAR; INTRAVENOUS; SUBCUTANEOUS at 00:15

## 2020-09-21 RX ADMIN — OLANZAPINE 10 MG: 10 INJECTION, POWDER, FOR SOLUTION INTRAMUSCULAR at 00:45

## 2020-09-21 RX ADMIN — DEXAMETHASONE SODIUM PHOSPHATE 10 MG: 10 INJECTION, SOLUTION INTRAMUSCULAR; INTRAVENOUS at 00:43

## 2020-09-21 RX ADMIN — HALOPERIDOL LACTATE 5 MG: 5 INJECTION, SOLUTION INTRAMUSCULAR at 01:49

## 2020-09-21 NOTE — ED TRIAGE NOTES
Pt c/o bilateral arm pain and numbness and hyperventilation tonight while watching football. Admits to smoking marijuana tonight. ABCs intact GCS 15

## 2020-09-21 NOTE — DISCHARGE INSTRUCTIONS
Discharge Instructions  Headache    You were seen today for a headache. Headaches may be caused by many different things such as muscle tension, sinus inflammation, anxiety and stress, having too little sleep, too much alcohol, some medical conditions or injury. You may have a migraine, which is caused by changes in the blood vessels in your head.  At this time your doctor does not find that your headache is a sign of anything dangerous or life-threatening.  However, sometimes the signs of serious illness do not show up right away.  If you have new or worse symptoms, you may need to be seen again in the emergency department or by your primary doctor.      Return to the Emergency Department if:  You get a fever of 101 F or higher.  Your headache gets much worse.  You get a stiff neck with your headache.  You get a new headache that is different or worse than headaches you have had before.  You are vomiting and can t keep food or water down.  You have blurry or double vision or other problems with your eyes.  You have a new weakness on one side of your body.  You have difficulty with balance which is new.  You or your family thinks you are confused.  You have a seizure or convulsion.    What can I do to help myself?  Pain medications - You may take a pain medication such as Tylenol  (acetaminophen), Advil , Nuprin  (ibuprofen) or Aleve  (naproxen).  If you have been given a narcotic such as Vicodin  (hydrocodone with acetaminophen), Percocet  (oxycodone with acetaminophen), codeine, or a muscle relaxant such as Flexeril  (cyclobenzaprine) or Soma  (carisoprodol), do not drive for four hours after you have taken it. If the narcotic contains Tylenol  (acetaminophen), do not take Tylenol  with it. All narcotics will cause constipation, so eat a high fiber diet.      Take a pain reliever as soon as you notice symptoms.  Starting medications as soon as you start to have symptoms may lessen the amount of pain you  have.  Relaxing in a quiet, dark room may help.  Get enough sleep and eat meals regularly.  Schedule an appointment with your primary physician as instructed, or at least within 1 week.  You may need to watch for certain foods or other things which may trigger your headaches.  Keeping a journal of your headaches and possible triggers may help you and your primary doctor to identify things which you should avoid which may be causing your headaches.  If you were given a prescription for medicine here today, be sure to read all of the information (including the package insert) that comes with your prescription.  This will include important information about the medicine, its side effects, and any warnings that you need to know about.  The pharmacist who fills the prescription can provide more information and answer questions you may have about the medicine.  If you have questions or concerns that the pharmacist cannot address, please call or return to the Emergency Department.   Opioid Medication Information    Pain medications are among the most commonly prescribed medicines, so we are including this information for all our patients. If you did not receive pain medication or get a prescription for pain medicine, you can ignore it.     You may have been given a prescription for an opioid (narcotic) pain medicine and/or have received a pain medicine while here in the Emergency Department. These medicines can make you drowsy or impaired. You must not drive, operate dangerous equipment, or engage in any other dangerous activities while taking these medications. If you drive while taking these medications, you could be arrested for DUI, or driving under the influence. Do not drink any alcohol while you are taking these medications.     Opioid pain medications can cause addiction. If you have a history of chemical dependency of any type, you are at a higher risk of becoming addicted to pain medications.  Only take these  prescribed medications to treat your pain when all other options have been tried. Take it for as short a time and as few doses as possible. Store your pain pills in a secure place, as they are frequently stolen and provide a dangerous opportunity for children or visitors in your house to start abusing these powerful medications. We will not replace any lost or stolen medicine.  As soon as your pain is better, you should flush all your remaining medication.     Many prescription pain medications contain Tylenol  (acetaminophen), including Vicodin , Tylenol #3 , Norco , Lortab , and Percocet .  You should not take any extra pills of Tylenol  if you are using these prescription medications or you can get very sick.  Do not ever take more than 3000 mg of acetaminophen in any 24 hour period.    All opioids tend to cause constipation. Drink plenty of water and eat foods that have a lot of fiber, such as fruits, vegetables, prune juice, apple juice and high fiber cereal.  Take a laxative if you don t move your bowels at least every other day. Miralax , Milk of Magnesia, Colace , or Senna  can be used to keep you regular.      Remember that you can always come back to the Emergency Department if you are not able to see your regular doctor in the amount of time listed above, if you get any new symptoms, or if there is anything that worries you.

## 2020-09-21 NOTE — ED PROVIDER NOTES
"  History     Chief Complaint:  Headache     HPI  Shaw Valencia is a 30 year old male who presents with headache. The patient reports that 2 hours ago he was watching football with friends when he had a sudden onset of a headache, described as his head exploding. The patient endorses associated abdominal pain that radiates into his chest and is described as a \"crushing\" sensation as well as hot flashes, and tingling to his arms and fingers. The patient had been using marijuana tonight, the same as he normally does, and he does not feel that this is related to his or his prior history of panic attacks. The patient's mother reports that he does not appear confused or disoriented but that he barely was able to walk into the emergency department.     Allergies:  Ibuprofen      Medications:    Medical history reviewed. No pertinent medical history.      Past Medical History:    ETOH dependence   Unresponsiveness   syncope   Suicide ideation   Kidney stones   Panic disorder   Concussion   Suspected COVID-19   Hematemesis     Past Surgical History:    Past surgical history reviewed. No pertinent past surgical history.    Family History:    Family history reviewed. No pertinent family history.     Social History:  The patient was accompanied to the ED by mother.  Smoking Status: Current Every Day Smoker  Smokeless Tobacco: Never Used  Alcohol Use: Negative   Drug Use: Negative  Marital Status:  Single      Review of Systems   Constitutional:        Hot flashes   Cardiovascular: Positive for chest pain.   Gastrointestinal: Positive for abdominal pain.   Neurological: Positive for headaches.        Tingling to arms and hands   Psychiatric/Behavioral: Negative for confusion.   All other systems reviewed and are negative.      Physical Exam     Patient Vitals for the past 24 hrs:   BP Temp Temp src Pulse Resp SpO2   09/21/20 0030 (!) 167/100 -- -- 81 -- 98 %   09/21/20 0015 (!) 142/86 -- -- 81 -- 98 %   09/21/20 0000 (!) " 172/97 -- -- 89 -- 97 %   09/20/20 2345 (!) 153/103 -- -- 87 -- 99 %   09/20/20 2340 (!) 164/89 -- -- -- -- 98 %   09/20/20 2330 (!) 170/109 -- -- 97 -- 98 %   09/20/20 2300 (!) 159/102 -- -- 93 -- 99 %   09/20/20 2230 (!) 171/106 -- -- 91 -- 100 %   09/20/20 2215 (!) 185/104 -- -- 102 -- 100 %   09/20/20 2132 (!) 170/73 98.3  F (36.8  C) Oral 88 20 99 %      Physical Exam  Constitutional:       General: He is in acute distress.      Comments: Pleasant and cooperative   HENT:      Right Ear: Tympanic membrane normal.      Left Ear: Tympanic membrane normal.      Mouth/Throat:      Pharynx: No posterior oropharyngeal erythema.   Eyes:      Conjunctiva/sclera: Conjunctivae normal.   Neck:      Musculoskeletal: Neck supple.   Cardiovascular:      Rate and Rhythm: Normal rate and regular rhythm.      Heart sounds: Normal heart sounds.   Pulmonary:      Effort: Pulmonary effort is normal.      Breath sounds: Normal breath sounds.   Abdominal:      General: Bowel sounds are normal. There is no distension.      Palpations: Abdomen is soft.      Tenderness: There is no abdominal tenderness. There is no guarding or rebound.   Musculoskeletal: Normal range of motion.   Skin:     General: Skin is warm and dry.   Neurological:      Mental Status: He is alert.          Emergency Department Course     Imaging:  Radiology findings were communicated with the p  who voiced understanding of the findings.    CTA Head Neck with Contrast   Final Result   IMPRESSION:    HEAD CTA:    1.  Normal CTA [facility].      NECK CTA:   1.  Normal neck CTA.      CT Head w/o Contrast   Final Result   IMPRESSION:   1.  Normal head CT.        Laboratory:  Laboratory findings were communicated with the patient who voiced understanding of the findings.    Labs Ordered and Resulted from Time of ED Arrival Up to the Time of Departure from the ED   CBC WITH PLATELETS DIFFERENTIAL - Abnormal; Notable for the following components:       Result Value     Absolute Neutrophil 8.6 (*)     All other components within normal limits   COMPREHENSIVE METABOLIC PANEL - Abnormal; Notable for the following components:    Potassium 3.2 (*)     Glucose 134 (*)     All other components within normal limits   PULSE OXIMETRY NURSING   GLUCOSE CSF   PROTEIN TOTAL CSF   CSF CULTURE AEROBIC BACTERIAL   GRAM STAIN   CELL COUNT WITH DIFFERENTIAL CSF      Procedures:    [facility]    -Lumbar Puncture    Date/Time: 9/21/2020 12:31 AM  Performed by: Ofelia Marvin MD  Authorized by: Ofelia Marvin MD     ANESTHESIA (see MAR for exact dosages):     Anesthesia method:  Local infiltration    Local anesthetic:  Lidocaine 1% w/o epi      PROCEDURE DETAILS:     Lumbar space:  L3-L4 interspace    Patient position:  Sitting    Needle gauge:  20    Ultrasound guidance: no      Number of attempts:  1    Fluid appearance:  Clear  PROCEDURE   Patient Tolerance:  Patient tolerated the procedure well with no immediate complications        Interventions:  2219 Dilaudid 1 mg IV  2230 Zofran 4 mg IV   2338 Benadryl   2339 Reglan 10 mg IV  0015 Dilaudid 1 mg IV  0043 Decadron 10 mg IV  0045 Zyprexa 10 mg IV   Medications   HYDROmorphone (DILAUDID) injection 1 mg (1 mg Intravenous Given 9/20/20 2219)   iopamidol (ISOVUE-370) solution 500 mL (70 mLs Intravenous Given 9/20/20 2205)   sodium chloride 0.9 % bag 500mL for CT scan flush use (80 mLs Intravenous Given 9/20/20 2205)   ondansetron (ZOFRAN) injection 4 mg (4 mg Intravenous Given 9/20/20 2230)   metoclopramide (REGLAN) injection 10 mg (10 mg Intravenous Given 9/20/20 2339)   diphenhydrAMINE (BENADRYL) injection 25 mg (25 mg Intravenous Given 9/20/20 2338)   HYDROmorphone (DILAUDID) injection 1 mg (1 mg Intravenous Given 9/21/20 0015)   OLANZapine (zyPREXA) injection 10 mg (10 mg Intramuscular Given 9/21/20 0045)   dexamethasone PF (DECADRON) injection 10 mg (10 mg Intravenous Given 9/21/20 0043)   haloperidol lactate (HALDOL)  injection 5 mg (5 mg Intravenous Given 9/21/20 0149)       Emergency Department Course:    2138 Nursing notes and vitals reviewed. I performed an exam of the patient as documented above.     2151 IV was inserted and blood was drawn for laboratory testing, results above.    2157 The patient was sent for a CT while in the emergency department, results above.     2300 Patient rechecked and updated.      0200 Prior to discharge, I personally reviewed the results with the patient and all related questions were answered. The patient verbalized understanding and is amenable to plan.     Impression & Plan        Medical Decision Making:  Shaw Valencia is a 30 year old male who presents to the emergency department today for evaluation of a sudden severe headache.  He arrived here about 2-1/2 hours after the onset of headache.  CT and CTA showed no significant abnormality.  I felt this likely ruled out subarachnoid hemorrhage but we tried aggressive management of his pain without much success.  LP was completed which also returns normal.  I anticipated we would need to admit him for pain control under the circumstances but he indicates that he prefers discharge home and is now beginning to feel improved.  He happens to have an appointment with his primary physician later today.  I have prescribed Reglan to use as needed, headache instructions, return if problems.      Diagnosis:      ICD-10-CM    1. Primary thunderclap headache  G44.53 Gram stain       Disposition:   Discharge    Discharge Medications:  New Prescriptions    METOCLOPRAMIDE (REGLAN) 5 MG TABLET    Take 1 tablet (5 mg) by mouth 4 times daily as needed (for headache)     Scribe Disclosure:  I, Orla Severson, am serving as a scribe at 9:43 PM on 9/20/2020 to document services personally performed by Ofelia Marvin MD based on my observations and the provider's statements to me.  [facility] EMERGENCY DEPARTMENT       Ofelia Marvin,  MD  09/21/20 0205

## 2020-09-24 ENCOUNTER — ANESTHESIA EVENT (OUTPATIENT)
Dept: SURGERY | Facility: CLINIC | Age: 30
End: 2020-09-24
Payer: COMMERCIAL

## 2020-09-24 ENCOUNTER — ANESTHESIA (OUTPATIENT)
Dept: SURGERY | Facility: CLINIC | Age: 30
End: 2020-09-24
Payer: COMMERCIAL

## 2020-09-24 ENCOUNTER — HOSPITAL ENCOUNTER (OUTPATIENT)
Facility: CLINIC | Age: 30
Discharge: HOME | End: 2020-09-24
Admitting: ANESTHESIOLOGY
Payer: COMMERCIAL

## 2020-09-24 VITALS
SYSTOLIC BLOOD PRESSURE: 128 MMHG | WEIGHT: 163 LBS | HEART RATE: 74 BPM | RESPIRATION RATE: 16 BRPM | HEIGHT: 72 IN | BODY MASS INDEX: 22.08 KG/M2 | DIASTOLIC BLOOD PRESSURE: 81 MMHG | OXYGEN SATURATION: 98 %

## 2020-09-24 PROCEDURE — 62273 INJECT EPIDURAL PATCH: CPT

## 2020-09-24 PROCEDURE — 40000010 ZZH STATISTIC ANES STAT CODE-CRNA PER MINUTE

## 2020-09-24 ASSESSMENT — MIFFLIN-ST. JEOR: SCORE: 1747.36

## 2020-09-24 NOTE — PROGRESS NOTES
Patient doing well after blood patch, neuro's intact, no numbness or tingling in legs or feet, tender in the back where injection was, no headache, discharge instructions reviewed with patient and patient verbalized understanding of instructions, no bleeding from injection site, patient appreciative for the help, MDA cleared patient to go home, mother came and picked patient up

## 2020-09-24 NOTE — ANESTHESIA PREPROCEDURE EVALUATION
Anesthesia Pre-Procedure Evaluation    Patient: Shaw Valencia   MRN: [id number] : 1990          Preoperative Diagnosis: Headache [R51]    Procedure(s):  Blood Patch    Past Medical History:   Diagnosis Date     EtOH dependence (H)      No past surgical history on file.                   Lab Results   Component Value Date    WBC 10.9 2020    HGB 15.6 2020    HCT 48.2 2020     2020     2020    POTASSIUM 3.2 (L) 2020    CHLORIDE 104 2020    CO2 22 2020    BUN 17 2020    CR 0.91 2020     (H) 2020    OPAL 9.5 2020    ALBUMIN 4.8 2020    PROTTOTAL 8.2 2020    ALT 26 2020    AST 20 2020    ALKPHOS 83 2020    BILITOTAL 0.6 2020    LIPASE 116 2015    PTT 26 10/21/2014    INR 0.95 10/21/2014    TSH 1.85 12/15/2013       Preop Vitals  BP Readings from Last 3 Encounters:   20 130/82   20 125/56   19 128/62    Pulse Readings from Last 3 Encounters:   20 76   20 75   19 94      Resp Readings from Last 3 Encounters:   20 20   20 18   19 16    SpO2 Readings from Last 3 Encounters:   20 96%   20 96%   19 99%      Temp Readings from Last 1 Encounters:   20 98.3  F (36.8  C) (Oral)    Ht Readings from Last 1 Encounters:   20 1.829 m (6')      Wt Readings from Last 1 Encounters:   20 73.9 kg (163 lb)    Estimated body mass index is 22.11 kg/m  as calculated from the following:    Height as of this encounter: 1.829 m (6').    Weight as of this encounter: 73.9 kg (163 lb).       Anesthesia Plan  Procedure only, no anesthetic delivered        Plan for Epidural (Blood patch for post dural puncture headache)     Patient with positional headache following dural puncture. Afebrile. No pain/tenderness/erythema at previous dural puncture site. Risks/benefits discussed, including bleeding, infection, damage to  tissue/nerves. Questions answered. Patient desires to undergo the procedure.        Postoperative Care      Consents  Anesthetic plan, risks, benefits and alternatives discussed with:  Patient..                 Wilfredo Jasso MD                    .

## 2020-09-24 NOTE — ANESTHESIA POSTPROCEDURE EVALUATION
Patient: Shaw Valencia    Procedure(s):  Blood Patch    Diagnosis:Headache [R51]  Diagnosis Additional Information: No value filed.    Pt doing well, headache gone. No obvious sequelae from procedure. Will discharge to home.    Note:  Anesthesia Post Evaluation    Last vitals:  Vitals:    09/24/20 0746   BP: 130/82   Pulse: 76   Resp: 20   SpO2: 96%         Electronically Signed By: Wilfredo Jasso MD  September 24, 2020  8:37 AM

## 2020-09-24 NOTE — ANESTHESIA PROCEDURE NOTES
Blood Patch:      Staff -   Anesthesiologist:  Wilfredo Jasso MD  Performed By: anesthesiologist  Referred By: Pankaj Dangelo location during procedure: Pre-op  Start time: 9/24/2020 7:51 AM  End time: 9/24/2020 8:12 AM  Preanesthetic Checklist:  Completed: patient identified, site marked, pre-op evaluation, timeout performed, IV checked, risks and benefits discussed, monitors and equipment checked and anesthesia consent given  Procedure Information:  Location of venous blood draw: arm  Volume of blood injected: 20 mL    Blood collected by assistant using sterile technique.  Patient position: sitting  Prep: povidone-iodine  Prep:gloves, mask and washed/disinfected hands    Approach: midline  Location: L2-3      Assessment:  Reason for Blood Patch: spinal headache

## 2020-09-24 NOTE — DISCHARGE INSTRUCTIONS
BLOOD PATCH DISCHARGE INSTRUCTIONS    LOW GRADE FEVER, MILD BACKACHE AND MILD NECK ACHE/STIFFNESS ARE NOT UNUSUAL AFTER  BLOOD PATCH.    YOU SHOULD CONTACT THE ANESTHESIOLOGIST IMMEDIATELY AT THE PHONE NUMBER BELOW IF ANY OF THE FOLLOWING OCCUR:    1.  BACKACHE OR NECK ACHE THAT IS PERSISTENT OR MORE THAN MODERATE.    2.  A FEVER GREATER THAN 100.5 DEGREES.    3.  LEG WEAKNESS OR NUMBNESS.    PLEASE CALL [phone] AND ASK FOR AN ANESTHESIOLOGIST OR CALL YOUR REGULAR DOCTOR IF YOU HAVE ANY PROBLEMS OR CONCERNS AFTER A BLOOD PATCH.

## 2020-09-26 LAB
BACTERIA SPEC CULT: NO GROWTH
SPECIMEN SOURCE: NORMAL

## 2021-08-27 ENCOUNTER — HOSPITAL ENCOUNTER (EMERGENCY)
Facility: CLINIC | Age: 31
Discharge: HOME | End: 2021-08-28
Attending: EMERGENCY MEDICINE
Payer: COMMERCIAL

## 2021-08-27 ENCOUNTER — APPOINTMENT (OUTPATIENT)
Dept: CT IMAGING | Facility: CLINIC | Age: 31
End: 2021-08-27
Attending: EMERGENCY MEDICINE
Payer: COMMERCIAL

## 2021-08-27 DIAGNOSIS — N20.0 KIDNEY STONE: ICD-10-CM

## 2021-08-27 DIAGNOSIS — I86.1 VARICOCELE: ICD-10-CM

## 2021-08-27 DIAGNOSIS — R10.9 FLANK PAIN: ICD-10-CM

## 2021-08-27 DIAGNOSIS — N50.3 EPIDIDYMAL CYST: ICD-10-CM

## 2021-08-27 DIAGNOSIS — D72.829 LEUKOCYTOSIS, UNSPECIFIED TYPE: ICD-10-CM

## 2021-08-27 DIAGNOSIS — N50.812 PAIN IN LEFT TESTICLE: Primary | ICD-10-CM

## 2021-08-27 LAB
ALBUMIN UR-MCNC: NEGATIVE MG/DL
ANION GAP SERPL CALCULATED.3IONS-SCNC: 7 MMOL/L (ref 3–14)
APPEARANCE UR: CLEAR
BASOPHILS # BLD AUTO: 0.1 10E3/UL (ref 0–0.2)
BASOPHILS NFR BLD AUTO: 0 %
BILIRUB UR QL STRIP: NEGATIVE
BUN SERPL-MCNC: 14 MG/DL (ref 7–30)
CALCIUM SERPL-MCNC: 9 MG/DL (ref 8.5–10.1)
CHLORIDE BLD-SCNC: 109 MMOL/L (ref 94–109)
CO2 SERPL-SCNC: 25 MMOL/L (ref 20–32)
COLOR UR AUTO: NORMAL
CREAT SERPL-MCNC: 0.79 MG/DL (ref 0.66–1.25)
EOSINOPHIL # BLD AUTO: 0.1 10E3/UL (ref 0–0.7)
EOSINOPHIL NFR BLD AUTO: 0 %
ERYTHROCYTE [DISTWIDTH] IN BLOOD BY AUTOMATED COUNT: 12.7 % (ref 10–15)
GFR SERPL CREATININE-BSD FRML MDRD: >90 ML/MIN/1.73M2
GLUCOSE BLD-MCNC: 86 MG/DL (ref 70–99)
GLUCOSE UR STRIP-MCNC: NEGATIVE MG/DL
HCT VFR BLD AUTO: 38.9 % (ref 40–53)
HGB BLD-MCNC: 13.2 G/DL (ref 13.3–17.7)
HGB UR QL STRIP: NEGATIVE
IMM GRANULOCYTES # BLD: 0.1 10E3/UL
IMM GRANULOCYTES NFR BLD: 0 %
KETONES UR STRIP-MCNC: NEGATIVE MG/DL
LEUKOCYTE ESTERASE UR QL STRIP: NEGATIVE
LYMPHOCYTES # BLD AUTO: 1.3 10E3/UL (ref 0.8–5.3)
LYMPHOCYTES NFR BLD AUTO: 9 %
MCH RBC QN AUTO: 29.9 PG (ref 26.5–33)
MCHC RBC AUTO-ENTMCNC: 33.9 G/DL (ref 31.5–36.5)
MCV RBC AUTO: 88 FL (ref 78–100)
MONOCYTES # BLD AUTO: 1.1 10E3/UL (ref 0–1.3)
MONOCYTES NFR BLD AUTO: 7 %
NEUTROPHILS # BLD AUTO: 12.1 10E3/UL (ref 1.6–8.3)
NEUTROPHILS NFR BLD AUTO: 84 %
NITRATE UR QL: NEGATIVE
NRBC # BLD AUTO: 0 10E3/UL
NRBC BLD AUTO-RTO: 0 /100
PH UR STRIP: 6 [PH] (ref 5–7)
PLATELET # BLD AUTO: 142 10E3/UL (ref 150–450)
POTASSIUM BLD-SCNC: 4 MMOL/L (ref 3.4–5.3)
RBC # BLD AUTO: 4.41 10E6/UL (ref 4.4–5.9)
RBC URINE: 0 /HPF
SODIUM SERPL-SCNC: 141 MMOL/L (ref 133–144)
SP GR UR STRIP: 1 (ref 1–1.03)
UROBILINOGEN UR STRIP-MCNC: NORMAL MG/DL
WBC # BLD AUTO: 14.6 10E3/UL (ref 4–11)
WBC URINE: <1 /HPF

## 2021-08-27 PROCEDURE — 36415 COLL VENOUS BLD VENIPUNCTURE: CPT | Performed by: EMERGENCY MEDICINE

## 2021-08-27 PROCEDURE — 80048 BASIC METABOLIC PNL TOTAL CA: CPT | Performed by: EMERGENCY MEDICINE

## 2021-08-27 PROCEDURE — 85004 AUTOMATED DIFF WBC COUNT: CPT | Performed by: EMERGENCY MEDICINE

## 2021-08-27 PROCEDURE — 96375 TX/PRO/DX INJ NEW DRUG ADDON: CPT

## 2021-08-27 PROCEDURE — 96374 THER/PROPH/DIAG INJ IV PUSH: CPT

## 2021-08-27 PROCEDURE — 87491 CHLMYD TRACH DNA AMP PROBE: CPT | Performed by: NURSE PRACTITIONER

## 2021-08-27 PROCEDURE — 81001 URINALYSIS AUTO W/SCOPE: CPT | Performed by: NURSE PRACTITIONER

## 2021-08-27 PROCEDURE — 250N000011 HC RX IP 250 OP 636: Performed by: EMERGENCY MEDICINE

## 2021-08-27 PROCEDURE — 99285 EMERGENCY DEPT VISIT HI MDM: CPT | Mod: 25

## 2021-08-27 PROCEDURE — 74176 CT ABD & PELVIS W/O CONTRAST: CPT

## 2021-08-27 PROCEDURE — 87591 N.GONORRHOEAE DNA AMP PROB: CPT | Performed by: NURSE PRACTITIONER

## 2021-08-27 RX ORDER — ONDANSETRON 2 MG/ML
4 INJECTION INTRAMUSCULAR; INTRAVENOUS ONCE
Status: COMPLETED | OUTPATIENT
Start: 2021-08-27 | End: 2021-08-27

## 2021-08-27 RX ORDER — FENTANYL CITRATE 50 UG/ML
50 INJECTION, SOLUTION INTRAMUSCULAR; INTRAVENOUS ONCE
Status: COMPLETED | OUTPATIENT
Start: 2021-08-27 | End: 2021-08-27

## 2021-08-27 RX ADMIN — ONDANSETRON 4 MG: 2 INJECTION INTRAMUSCULAR; INTRAVENOUS at 23:23

## 2021-08-27 RX ADMIN — FENTANYL CITRATE 50 MCG: 50 INJECTION, SOLUTION INTRAMUSCULAR; INTRAVENOUS at 23:24

## 2021-08-27 ASSESSMENT — ENCOUNTER SYMPTOMS
FLANK PAIN: 1
BACK PAIN: 1
FEVER: 0
CHILLS: 0
ABDOMINAL PAIN: 1

## 2021-08-27 NOTE — Clinical Note
Shaw Valencia was seen and treated in our emergency department on 8/27/2021.  He may return to work on 08/30/2021.       If you have any questions or concerns, please don't hesitate to call.      Candace Louis, DO

## 2021-08-28 ENCOUNTER — APPOINTMENT (OUTPATIENT)
Dept: ULTRASOUND IMAGING | Facility: CLINIC | Age: 31
End: 2021-08-28
Attending: NURSE PRACTITIONER
Payer: COMMERCIAL

## 2021-08-28 VITALS
DIASTOLIC BLOOD PRESSURE: 63 MMHG | HEART RATE: 71 BPM | TEMPERATURE: 98.6 F | OXYGEN SATURATION: 97 % | SYSTOLIC BLOOD PRESSURE: 125 MMHG | RESPIRATION RATE: 20 BRPM

## 2021-08-28 LAB
C TRACH DNA SPEC QL NAA+PROBE: NEGATIVE
N GONORRHOEA DNA SPEC QL NAA+PROBE: NEGATIVE

## 2021-08-28 PROCEDURE — 76870 US EXAM SCROTUM: CPT

## 2021-08-28 RX ORDER — ASPIRIN 81 MG
100 TABLET, DELAYED RELEASE (ENTERIC COATED) ORAL DAILY
Qty: 10 TABLET | Refills: 0 | Status: SHIPPED | OUTPATIENT
Start: 2021-08-28

## 2021-08-28 NOTE — ED TRIAGE NOTES
Pt states left sided testicle/scrotal pain radiating to left hip for over one week. Pt states seen by PCP for same and placed on antibiotics without improvement. ABCs intact GCS 15

## 2021-08-28 NOTE — ED PROVIDER NOTES
History   Chief Complaint:  Testicular/Scrotal Pain     The history is provided by the patient.      Shaw Valencia is a 31 year old male who presents with testicular/scrotal pain. Patient has left sided testicular pain that radiates into his stomach, groin, L. Lower back, and down his leg for one week. He was seen by his primary care provider on 8/24 regarding this complaint. UA and STI testing completed.  Patient was given dose of IM rocephin and initiated on doxycycline for presumed epididymitis.  He reports having minimal improvements in symptoms.  Pain was intermittent for the first couple of days but has been constant for the last two. He also notes he gets hot flashes. He has taken Tylenol for pain. He does have history of kidney stones which he states this feels somewhat similar. He has no concern for STIs. No fever or chills. No penile discharge, dysuria, nausea, vomiting, paresthesias, focal weakness. No alcohol or drug use.     Review of Systems   Constitutional: Negative for chills and fever.   Gastrointestinal: Positive for abdominal pain.   Genitourinary: Positive for flank pain and testicular pain. Negative for discharge.   Musculoskeletal: Positive for back pain.   All other systems reviewed and are negative.    Allergies:  Ibuprofen    Medications:  Doxycycline    Past Medical History:    Syncope  Suicide ideation  Nephrolithiasis  Adhesive capsulitis  Major depressive disorder\  Panic disorder     Past Surgical History:    Blood patch     Family History:    Brother: asthma  Father: hypertension  Mother: asthma, fibromyalgia, hypertension    Social History:  Presents to ED alone    Physical Exam     Patient Vitals for the past 24 hrs:   BP Temp Temp src Pulse Resp SpO2   08/27/21 2141 (!) 155/75 98.6  F (37  C) Oral 109 20 98 %       Physical Exam  Nursing note and vitals reviewed.  Constitutional: Well nourished.   Eyes: Conjunctiva normal.  Pupils are equal, round, and reactive to light.    ENT: Nose normal. Mucous membranes pink and moist.    Neck: Normal range of motion.  CVS: Normal rate, regular rhythm.  Normal heart sounds.  No murmur.  Pulmonary: Lungs clear to auscultation bilaterally. No wheezes/rales/rhonchi.  GI: Abdomen soft. Nontender, nondistended. No rigidity or guarding.  Mild L. CVA tenderness  : Circumcised.  Mild L. Testicular tenderness, no overlying warmth/eythema/crepitance. Normal penis, no penile discharge.  Chaperone RN Gala  MSK: No calf tenderness or swelling.  No c/t/l bony tenderness  Neuro: Alert. Follows simple commands.  Skin: Skin is warm and dry. No rash noted.   Psychiatric: Normal affect.       Emergency Department Course   Imaging:  Abd/pelvis CT no contrast - Stone Protocol  1.  No definite acute abnormalities or CT findings to explain the patient's symptoms.     2.  There is a single tiny 2 mm nonobstructing stone seen in each kidney. No ureteral stones or hydronephrosis.     3.  No acute bowel findings. Normal appendix.     4.  Moderate to large amount of stool in the colon. No evidence for obstruction.   As per radiology.     US Testicular & Scrotum w Doppler Ltd  1.  There is a small left-sided varicocele.   2.  Scrotal contents otherwise essentially normal. No torsion or mass.   3.  Tiny left epididymal head cyst.  As per radiology.     Laboratory:  BMP: WNL (Creatinine 0.79)     CBC: WBC 14.6(H), HGB 13.2(L), (L)    UA with microscopic: WNL      Chlamydia trachomatis PCR: Pending  Neisseria gonorrhoeae PCR: Pending    Emergency Department Course:    Reviewed:  I reviewed nursing notes, vitals, past medical history and care everywhere    Assessments:  2302 I obtained history and examined the patient as noted above.   0048 I rechecked the patient and explained findings.     Interventions:  2323 Zofran, 4 mg, IV  2324 Fentanyl, 50 mcg, IV    Disposition:  The patient was discharged to home.       Impression & Plan     Medical Decision Making:  Patient  is a 31-year-old male presenting with a multitude of complaints predominantly left back pain as well as testicular pain.  He is nontoxic though mildly tachycardic on arrival.  He underwent an extensive work-up during his time in the ED.  He has been on outpatient antibiotics for presumed epididymitis.  Testicular ultrasound without evidence of epididymitis or testicular torsion.  Concerns for small left varicocele as well as epididymal head cyst.  Patient did also have mild left CVA tenderness and given history of renal calculi, decision was made to pursue formal CT which is fortunately without evidence of intra-abdominal catastrophe.  Two nonobstructing kidney stones identified which I discussed with the patient this should not be causing the patient pain.  He does have a moderate to large amount of stool in the colon and I question if this is in part contributing to his pain.  He has a mild leukocytosis though is overall not septic appearing.  There is no evidence to suggest severe sepsis based on lab work.  UA without infection.  He was tested again for gonorrhea and chlamydia in light of his symptoms, result still pending and patient made aware that results will be available in roughly 48 hours on [facility].  There was no evidence of exam to suggest overlying cellulitis.  Patient did report some symptom improvement during his time in the ED after antiemetics and fentanyl.  He was requesting further pain medications however I counseled patient I am unsure of the etiology to explain the persistence of his pain.  Review of [facility] does show that he receives narcotic prescriptions every few months.  I counseled that at this point in time I do not feel further additional narcotic medication is warranted though did recommend Tylenol as needed and I will initiate stool softener on dispo.  Patient has a reported allergy to ibuprofen/NSAIDs.  He will be provided urology referral as well.  He is instructed to return  for fever, worsening abdominal pain or should symptoms worsen or change.  All questions addressed.      Covid-19  Shawerica Valencia was evaluated during a global COVID-19 pandemic, which necessitated consideration that the patient might be at risk for infection with the SARS-CoV-2 virus that causes COVID-19.   Applicable protocols for evaluation were followed during the patient's care.     Diagnosis:    ICD-10-CM    1. Pain in left testicle  N50.812    2. Flank pain  R10.9    3. Varicocele  I86.1    4. Leukocytosis, unspecified type  D72.829    5. Kidney stone  N20.0     nonobstructing   6. Epididymal cyst  N50.3        Discharge Medications:  New Prescriptions    DOCUSATE SODIUM (COLACE) 100 MG TABLET    Take 1 tablet (100 mg) by mouth daily       Scribe Disclosure:  Robin EDMONDS, am serving as a scribe at 11:01 PM on 8/27/2021 to document services personally performed by Candace Louis DO based on my observations and the provider's statements to me.            Candace Louis DO  08/28/21 0106